# Patient Record
Sex: FEMALE | Race: WHITE | NOT HISPANIC OR LATINO | Employment: FULL TIME | URBAN - METROPOLITAN AREA
[De-identification: names, ages, dates, MRNs, and addresses within clinical notes are randomized per-mention and may not be internally consistent; named-entity substitution may affect disease eponyms.]

---

## 2017-04-21 ENCOUNTER — OFFICE VISIT - RIVER FALLS (OUTPATIENT)
Dept: FAMILY MEDICINE | Facility: CLINIC | Age: 37
End: 2017-04-21

## 2017-10-05 ENCOUNTER — AMBULATORY - RIVER FALLS (OUTPATIENT)
Dept: FAMILY MEDICINE | Facility: CLINIC | Age: 37
End: 2017-10-05

## 2018-04-25 ENCOUNTER — OFFICE VISIT - RIVER FALLS (OUTPATIENT)
Dept: FAMILY MEDICINE | Facility: CLINIC | Age: 38
End: 2018-04-25

## 2018-04-25 ASSESSMENT — MIFFLIN-ST. JEOR: SCORE: 1520.44

## 2018-04-28 ENCOUNTER — TRANSFERRED RECORDS (OUTPATIENT)
Dept: HEALTH INFORMATION MANAGEMENT | Facility: CLINIC | Age: 38
End: 2018-04-28

## 2018-04-28 LAB — HPV ABSTRACT: NORMAL

## 2018-04-29 LAB
CHOLEST SERPL-MCNC: 127 MG/DL
CHOLEST/HDLC SERPL: 2.4 {RATIO}
CREAT SERPL-MCNC: 0.89 MG/DL (ref 0.5–1.1)
GLUCOSE BLD-MCNC: 79 MG/DL (ref 65–99)
HDLC SERPL-MCNC: 54 MG/DL
LDLC SERPL CALC-MCNC: 58 MG/DL
NONHDLC SERPL-MCNC: 73 MG/DL
TRIGL SERPL-MCNC: 74 MG/DL

## 2020-03-04 ENCOUNTER — OFFICE VISIT - RIVER FALLS (OUTPATIENT)
Dept: FAMILY MEDICINE | Facility: CLINIC | Age: 40
End: 2020-03-04

## 2020-03-04 ASSESSMENT — MIFFLIN-ST. JEOR: SCORE: 1516.81

## 2020-03-05 ENCOUNTER — COMMUNICATION - RIVER FALLS (OUTPATIENT)
Dept: FAMILY MEDICINE | Facility: CLINIC | Age: 40
End: 2020-03-05

## 2020-03-05 LAB
BUN SERPL-MCNC: 18 MG/DL (ref 7–25)
BUN/CREAT RATIO - HISTORICAL: NORMAL (ref 6–22)
CALCIUM SERPL-MCNC: 9.6 MG/DL (ref 8.6–10.2)
CHLORIDE BLD-SCNC: 103 MMOL/L (ref 98–110)
CHOLEST SERPL-MCNC: 124 MG/DL
CHOLEST/HDLC SERPL: 2.3 {RATIO}
CO2 SERPL-SCNC: 29 MMOL/L (ref 20–32)
CREAT SERPL-MCNC: 0.9 MG/DL (ref 0.5–1.1)
EGFRCR SERPLBLD CKD-EPI 2021: 80 ML/MIN/1.73M2
GLUCOSE BLD-MCNC: 81 MG/DL (ref 65–99)
HBA1C MFR BLD: 5.4 %
HDLC SERPL-MCNC: 54 MG/DL
LDLC SERPL CALC-MCNC: 56 MG/DL
NONHDLC SERPL-MCNC: 70 MG/DL
POTASSIUM BLD-SCNC: 4 MMOL/L (ref 3.5–5.3)
SODIUM SERPL-SCNC: 140 MMOL/L (ref 135–146)
TRIGL SERPL-MCNC: 62 MG/DL

## 2020-10-16 ENCOUNTER — OFFICE VISIT - RIVER FALLS (OUTPATIENT)
Dept: FAMILY MEDICINE | Facility: CLINIC | Age: 40
End: 2020-10-16

## 2020-10-16 LAB — HCG UR QL: NEGATIVE

## 2020-10-26 LAB — HPV HIGH RISK: NOT DETECTED

## 2020-10-28 ENCOUNTER — COMMUNICATION - RIVER FALLS (OUTPATIENT)
Dept: FAMILY MEDICINE | Facility: CLINIC | Age: 40
End: 2020-10-28

## 2021-11-02 ENCOUNTER — AMBULATORY - RIVER FALLS (OUTPATIENT)
Dept: FAMILY MEDICINE | Facility: CLINIC | Age: 41
End: 2021-11-02

## 2022-02-11 VITALS
DIASTOLIC BLOOD PRESSURE: 72 MMHG | SYSTOLIC BLOOD PRESSURE: 122 MMHG | BODY MASS INDEX: 23.19 KG/M2 | HEART RATE: 66 BPM | TEMPERATURE: 98.8 F | WEIGHT: 168.6 LBS

## 2022-02-11 VITALS
WEIGHT: 175.6 LBS | TEMPERATURE: 98.5 F | DIASTOLIC BLOOD PRESSURE: 70 MMHG | BODY MASS INDEX: 25.2 KG/M2 | OXYGEN SATURATION: 98 % | HEART RATE: 64 BPM | SYSTOLIC BLOOD PRESSURE: 110 MMHG

## 2022-02-11 VITALS
HEART RATE: 68 BPM | SYSTOLIC BLOOD PRESSURE: 108 MMHG | WEIGHT: 172 LBS | BODY MASS INDEX: 24.62 KG/M2 | DIASTOLIC BLOOD PRESSURE: 84 MMHG | TEMPERATURE: 97.4 F | HEIGHT: 70 IN

## 2022-02-11 VITALS
HEART RATE: 73 BPM | WEIGHT: 171.2 LBS | TEMPERATURE: 97.8 F | HEIGHT: 70 IN | SYSTOLIC BLOOD PRESSURE: 120 MMHG | BODY MASS INDEX: 24.51 KG/M2 | DIASTOLIC BLOOD PRESSURE: 80 MMHG | OXYGEN SATURATION: 98 %

## 2022-02-16 NOTE — PROGRESS NOTES
"   Patient:   PRECIOUS DACOSTA            MRN: [id number]            FIN: [id number]               Age:   36 years     Sex:  Female     :  1980   Associated Diagnoses:   Pelvic pain; Vaginal discharge   Author:   Sandhya Black      Visit Information      Date of Service: 2017 03:40 pm  Performing Location: [facility]  Encounter#: [id number]      Chief Complaint   2017 3:44 PM CDT    Pelvic pain. Pain has been going on for the past 18 months but has gotten inreasingly more painful over the past 7 days. Pt describes pain as sharp.      History of Present Illness   Patient is a 36 year old  female who presents for evaluation of left sided pelvic pain x 18 months. Patient had mirena placed in 2015 and patient notes pelvic pain did seem to onset shortly after this but she thought it was just due to her body changing since she was postpartum. Reports pelvic pain seemed to worsen about 1 month ago. Over the past week, pain again acutely worsened complaining she has been experiencing vaginal pressure similar to how she felt when she was 9 months pregnant prompting her to be evaluated. States pain was up to 6/10 a couple days ago and required her to lay down. Current pain is rated at 4/10. She has been taking tylenol and ibuprofen intermittently with some relief.   Has also been experiencing vaginal discharge for ~18 months as well. Describes it as thick white-yellow colored discharge without odor. At times, discharge soaks through her underwear and pants. She wears panty liners almost daily.   Patient has been  to  for 16 years and in monogomous relationship. She does report having questionable \"BV or PID\" years ago which required her  to be treated as well. She denies having positive STD testing in the past. Unsure of abx prescribed at that time.   Denies dyspareunia, vaginal bleeding, fever, chills, nausea, or vomiting. No hx of ovarian cysts, fibroids, " or endometriosis. Family hx signfiicant for maternal grandmother with breast cancer but otherwise denies gynecological cancers.   Employed as a . Nonsmoker. Occasional etoh use.      Review of Systems   Constitutional:  Negative.    Eye:  Negative.    Ear/Nose/Mouth/Throat:  Negative.    Respiratory:  Negative.    Cardiovascular:  Negative.    Gastrointestinal:  Negative.    Genitourinary:  Negative.       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  Resolved on 7/1/2012 at 31 years.   Family History:    Breast cancer  Grandmother (M)     Procedure history:    Mirena IUD insertion on 11/6/2015 at 35 Years.  Comments:  11/6/2015 11:30 AM - René PATRICIO Erin  Removal due 11/6/2020. Lot# [id number] Exp: 04/18   Social History:        Alcohol Assessment            Current                     Comments:                      12/12/2013 - Dona Pepper MD                     About 1-2 drinks up to 4 times a week.      Tobacco Assessment: Denies Tobacco Use            Never      Substance Abuse Assessment: Denies Substance Abuse            Never      Employment and Education Assessment            Employed                     Comments:                      12/12/2013 - Evgeny  Dona QUAN                     Works as a       Home and Environment Assessment            Marital status: .      Exercise and Physical Activity Assessment: Does not exercise            Exercise frequency: Never.        Physical Examination   Vital Signs   4/21/2017 3:44 PM CDT Temperature Tympanic 98.8 DegF    Peripheral Pulse Rate 66 bpm    Pulse Site Radial artery    HR Method Manual    Systolic Blood Pressure 122 mmHg    Diastolic Blood Pressure 72 mmHg    Mean Arterial Pressure 89 mmHg    BP Site Right arm    BP Method Manual      Measurements from flowsheet : Measurements   4/21/2017 3:44 PM CDT    Weight Measured - Standard                168.6 lb     General:  Alert and oriented, No acute distress.    Neck:  Supple.    Respiratory:  Lungs are clear to auscultation, Respirations are non-labored.    Cardiovascular:  Normal rate, Regular rhythm.    Gastrointestinal:  Soft, Non-tender.    Genitourinary:  Normal external genitalia and vaginal introitus. Normal vaginal vault with moderate amount of white discharge. Mirena IUD strings approximately 4cm in length protruding out of cervical os. No cervical motion tenderness, adnexal masses, or right adnexal tenderness. Left adnexal tenderness on bimanual exam. Cultures obtained for wet prep and GC/Chlamydia.    Musculoskeletal:  Normal range of motion, Normal gait.    Integumentary:  Warm, Dry.    Neurologic:  Alert, Oriented.       Review / Management   Results review:  UA, wet prep, GC and chlamydia pending  Pelvic US ordered.       Impression and Plan   Diagnosis     Pelvic pain (AXC73-DG R10.2).     Vaginal discharge (BBM41-RR N89.8).     Presents for evaluation of 18 month history of left sided pelvic pain that acutely worsened over the past week. Also complains of white vaginal discharge. Vitals normal. Exam significant for moderate amount of white discharge and left adnexal tenderness. IUD strings protruding out of cervical os, but did not palpate  any abnormalities with IUD on bimanual exam. UA, wet prep, and gonorrhea & chlamydia pending. Will obtain pelvic US to assess for any structural pathology or abnormalities. Patient declined any analgesics upon discharge. Recommended her to use tylenol or ibuprofen as needed.  Instructed patient to return to clinic if symptoms not improving or go to ED if symtoms worsen. Patient verbalized understanding and agreement with plan. All questions were answered.    will contact her with pelvic US result once available, I suspet she will need to have IUD removed as pain onset correlates with insertion

## 2022-02-16 NOTE — PROCEDURES
Accession Number:       [id number]  CLINICAL INFORMATION::     None given  LMP::     PROPHYLAXIS  PREV. PAP::     2015  PREV. BX::     NONE GIVEN  SOURCE::     Cervix  STATEMENT OF ADEQUACY::     Satisfactory for evaluation. Endocervical/transformation zone component present. Age and/or menstrual status not provided  INTERPRETATION/RESULT::     Negative for intraepithelial lesion or malignancy.  COMMENT::     This Pap test has been evaluated with computer assisted technology.  CYTOTECHNOLOGIST::     AAM, CT(ASCP) CT Screening location: [place] [address]  REVIEW CYTOTECHNOLOGIST::     AMEE AGUILA(ASCP) CT Screening location: [place] [id number]  COMMENT:     See comment       EXPLANATORY NOTE:         The Pap is a screening test for cervical cancer. It is       not a diagnostic test and is subject to false negative       and false positive results. It is most reliable when a       satisfactory sample, regularly obtained, is submitted       with relevant clinical findings and history, and when       the Pap result is evaluated along with historic and       current clinical information.  HPV mRNA E6/E7:     Not Detected       This test was performed using the APTIMA HPV Assay ([employer]       This assay detects E6/E7 viral messenger RNA (mRNA) from 14       high-risk HPV types (16,18,31,33,35,39,45,51,52,56,58,59,66,68).

## 2022-02-16 NOTE — LETTER
(Inserted Image. Unable to display)     April 26, 2019      PRECIOUS DACOSTA  [address] [place], [place] [id number]          Dear PRECIOUS,      Thank you for selecting [facility] (previously [facility]) for your healthcare needs.      Our records indicate you are due for the following services:     Annual Physical and Gynecologic Exam ~ Yearly wellness exams are important for your ongoing health and wellness.  This exam gives you the opportunity to meet with your health care provider to review your health, update immunizations and to recommend preventive screenings that you may be due for.  At your wellness visit, your Healthcare Provider will determine the need for a gynecologic exam and/or Pap smear.      To schedule an appointment or if you have further questions, please contact your primary clinic:   [facility]       [phone]   [facility]       [phone]              [facility]     [phone]      Powered by [employer]    Sincerely,    Healthcare Providers at [facility]

## 2022-02-16 NOTE — PROGRESS NOTES
Patient:   SHAYLA DACOSTA            MRN: [id number]            FIN: [id number]               Age:   37 years     Sex:  Female     :  1980   Associated Diagnoses:   FH: hemochromatosis; FH: kidney disease; Lipid screening; Well woman exam   Author:   Sandhya Black      Visit Information      Date of Service: 2018 11:00 am  Performing Location: [facility]  Encounter#: [id number]      Primary Care Provider (PCP):  [id number] -UNKNOWN,      Chief Complaint   2018 11:01 AM CDT   Annual exam. Pt is fasting.      Well Adult History   PPC for annual exam  mirena IUD since 2015, last pap NILM   ftr, 73 with hemochromatosis, MI this past yr with Stent placement followed by pacemaker and CHF  dtr has hematuria and pediatric nephrologist from [facility] (Dr Wisam Cottrell), wants Shayla to have protein/creatinine ratio  no other health care concerns      Review of Systems   Constitutional:  Negative.    Eye:  Negative.    Ear/Nose/Mouth/Throat:  Negative.    Respiratory:  Negative.    Cardiovascular:  Negative.    Breast:  Negative.    Gastrointestinal:  Negative.    Genitourinary:  Negative except as documented in history of present illness.    Gynecologic:  Negative except as documented in history of present illness.    Hematology/Lymphatics:  Negative except as documented in history of present illness.    Endocrine:  Negative.    Immunologic:  Negative.    Musculoskeletal:  Negative.    Integumentary:  Negative.    Neurologic:  Negative.    Psychiatric:  Negative.       Health Status   Allergies:    Allergic Reactions (Selected)  No known allergies   Medications:  (Selected)   Documented Medications  Documented  Daily Multi: 1 tab(s), po, daily, 0 Refill(s), Type: Maintenance  Mirena 52 mg intrauteral device: 1 EA ( 52 mg ), intrauteral, once, Instructions: Inserted on 11/6/15-removal due  2020 Lot# [id number] Exp: ., 0 Refill(s), Type: Maintenance   Problem list:     All Problems (Selected)  Cystic fibrosis carrier / SNOMED CT [id number] / Confirmed      Histories   Family History:    Mother    History is negative.  Father  Coronary heart disease  Heart attack  Heart Failure  Heart disease  Kidney disease  Grandmother (M)  Breast cancer     Procedure history:    Mirena IUD insertion on 11/6/2015 at 35 Years.  Comments:  11/6/2015 11:30 AM - Lauriecharlse PATRICIO, Erin  Removal due 11/6/2020. Lot# [id number] Exp: 04/18   Social History:        Alcohol Assessment            Current                     Comments:                      12/12/2013 - Dona Pepper MD                     About 1-2 drinks up to 4 times a week.      Tobacco Assessment: Denies Tobacco Use            Never      Substance Abuse Assessment: Denies Substance Abuse            Never      Employment and Education Assessment            Employed                     Comments:                      12/12/2013 - Evgeny QUAN, Dona                     Works as a       Home and Environment Assessment            Marital status: .      Exercise and Physical Activity Assessment: Does not exercise            Exercise frequency: Never.        Physical Examination   Vital Signs   4/25/2018 11:01 AM CDT Temperature Tympanic 97.4 DegF  LOW    Peripheral Pulse Rate 68 bpm    Pulse Site Radial artery    HR Method Manual    Systolic Blood Pressure 108 mmHg    Diastolic Blood Pressure 84 mmHg  HI    Mean Arterial Pressure 92 mmHg    BP Site Right arm    BP Method Manual      Measurements from flowsheet : Measurements   4/25/2018 11:01 AM CDT Height Measured - Standard 70 in    Weight Measured - Standard 172 lb    BSA 1.96 m2    Body Mass Index 24.68 kg/m2      General:  Alert and oriented, No acute distress.    Eye:  Pupils are equal, round and reactive to light, Intact accommodation, Normal conjunctiva, Vision unchanged.         Periorbital area: Within normal limits.    HENT:  Normocephalic, Tympanic  membranes are clear, Normal hearing, Oral mucosa is moist, No pharyngeal erythema, No sinus tenderness.    Neck:  Supple, Non-tender, No lymphadenopathy, No thyromegaly.    Respiratory:  Lungs are clear to auscultation, Respirations are non-labored, No chest wall tenderness.    Cardiovascular:  Normal rate, Regular rhythm, No murmur, No edema.    Breast:  No mass, No tenderness.    Gastrointestinal:  Soft, Non-tender, Non-distended, Normal bowel sounds, No organomegaly.    Genitourinary:  No costovertebral angle tenderness.         Labia: Bilateral, Within normal limits.         Mons pubis: WNL.         Perineum: Within normal limits.         Vulva: Within normal limits.         Urethra: Within normal limits.         Cervix: Within normal limits.         Uterus: Within normal limits.         Adnexa: Bilateral, Within normal limits.    Lymphatics:  No lymphadenopathy neck, axilla, groin.    Musculoskeletal:  Normal range of motion, Normal strength, No swelling.    Integumentary:  Warm, Dry, Pink.    Neurologic:  Alert, Oriented.    Psychiatric:  Cooperative, Appropriate mood & affect.       Impression and Plan   Diagnosis     FH: hemochromatosis (UXU74-WN Z83.49).     FH: kidney disease (ILX12-OU Z84.1).     Lipid screening (RUL98-EM Z13.220).     Well woman exam (FOP55-SK Z01.419).     Patient Instructions:       Counseled: Patient, Regarding diagnosis, Regarding medications, Verbalized understanding.    Summary:  pap obtained, IUD strings visible    no medications to refill    hemochromatosis: panel ordered today, if abnormal discussion will need to occur about hepatitis screening and immunization  will also need to see hematology    request in chart for urine protein/creat ratio to go to Dr Cottrell    exercise 30 minutes at least 3x/week encouraged  discussed healthy nutrition, lean meats and limiting red meats, deep water fish limited to less than 3x/week  discussed relaxation, sleep regimen and relationship  health  f/u in one year  .

## 2022-02-16 NOTE — PROGRESS NOTES
Chief Complaint    IUD removal and insertion.  History of Present Illness      Patient presents to clinic today with desire to have Mirena IUD removed and new one placed.  She would also like her flu shot.      Negative urine pregnancy test is obtained today. Also discussed with patient risks of pain bleeding infection injury to surrounding tissue and need for further treatment possible uterine perforation, spontaneous expulsion and ectopic pregnancy. She would like to proceed. Alternatives include birth control pills, NuvaRing, Ortho Evra, ParaGard, Nexplanon, Depo-Provera. Also discussed natural family planning and barrier methods. She feels that a Mirena is the best choice for her.      Patient was placed into the stirrups, sterile speculum inserted, pap smear specimen obtained,  cervix prepped with Betadine 3,  strings grasped with sterile ring forceps, uterus sounded to 7 cm.       Mirena placed to the same depth.  strings trimmed.       Estimated blood loss is less than 5 mL. Overall the procedure was tolerated well.      Counseled patient to return to clinic in 2 weeks for string recheck.  Counseled that mirena is now approved to use for contraception for 6 years.  Physical Exam   Vitals & Measurements    T: 98.5  F (Tympanic)  HR: 64 (Peripheral)  BP: 110/70  SpO2: 98%     WT: 175.6 lb   Assessment/Plan       Encounter for immunization (Z23)         Ordered:          influenza virus vaccine, inactivated, 0.5 mL, IM, once, (Completed)          45150 imadm prq id subq/im njxs 1 vaccine (Charge), Quantity: 1, Encounter for immunization          09399 iiv4 vacc no prsv 0.5 ml im (Charge), Quantity: 1, Encounter for immunization                Encounter for IUD insertion (Z30.430)         Ordered:          POC HCG, URINE* ([facility]), Specimen Type: Urine, Collection Date: 10/16/20 13:07:00 CDT                Encounter for IUD removal and reinsertion (Z30.433)         Ordered:          91388 unlisted px skin muc  membrane +subq tissue (Charge), Quantity: 1, Encounter for IUD removal and reinsertion                Well woman exam (Z01.419)         Ordered:          HPV DNA, High Risk with Reflex to Genotypes 16,18* ([facility]), Specimen Type: Pap, Collection Date: 10/16/20 14:29:00 CDT          Thinprep Pap* ([facility]), Specimen Type: Pap, Collection Date: 10/16/20 14:29:00 CDT               mirena removed, new one placed, pap pending, needs mirena exchanged/out in 6 years, fl;u shot provided  Patient Information     Name:PRECIOUS DACOSTA      Address:      [address] [place] [id number]     Sex:Female     YOB: 1980     [phone]     Emergency Contact:[facility], CONTACT     MRN:[id number]     FIN:[id number]     Location[facility]     Date of Service:10/16/2020      Primary Care Physician:       Amisha Avila MD, [phone]      Attending Physician:       Amisha Avila MD, [phone]  Problem List/Past Medical History    Ongoing     Cystic fibrosis carrier    Historical     Pregnancy  Procedure/Surgical History     Insertion of IUD (10/16/2020)            Comments: MIRENA      lot [id number]      ex: 11/2022.     Mirena IUD insertion (11/06/2015)            Comments: Removal due 11/6/2020. Lot# [id number] Exp: 04/18.  Medications    Daily Multi, 1 tab(s), Oral, daily    Mirena, Intrauteral, once  Allergies    No Known Medication Allergies  Social History    Smoking Status     Never smoker     Alcohol      Current     Employment/School      Employed     Exercise - Does not exercise      Exercise frequency: Never.     Home/Environment      Marital status: .     Substance Abuse - Denies Substance Abuse      Never     Tobacco - Denies Tobacco Use      Never  Family History    Breast cancer: Grandmother (M).    CAD - Coronary artery disease: Father.    Cardiac pacemaker in situ: Father.    Cardiomyopathy: Father.    Chronic kidney disease stage 3: Father.     Congestive heart failure: Father.    Coronary heart disease: Father.    Eczema: Son.    Essential hypertension: Father.    Gout: Father.    Heart Failure: Father.    Heart attack: Father.    Heart disease: Father.    Hypertension: Daughter.    Kidney disease: Father.    Myocardial infarction: Father.    Paroxysmal atrial fibrillation: Father.    Sleep apnea: Father.    Mother: History is negative  Immunizations      Vaccine Date Status          influenza virus vaccine, inactivated 10/16/2020 Given          hepatitis A adult vaccine 03/04/2020 Given          influenza virus vaccine, inactivated 11/19/2019 Recorded          influenza virus vaccine, inactivated 10/05/2017 Given          influenza virus vaccine, inactivated 11/12/2016 Given          influenza virus vaccine, inactivated 09/16/2015 Given          tetanus/diphth/pertuss (Tdap) adult/adol 07/01/2015 Given          influenza virus vaccine, inactivated 10/03/2013 Recorded          influenza virus vaccine, inactivated 09/11/2012 Recorded          influenza virus vaccine, inactivated 11/04/2011 Recorded          influenza virus vaccine, inactivated 11/04/2010 Recorded          influenza, H1N1, inactivated 01/12/2010 Recorded          influenza virus vaccine, inactivated 09/18/2009 Recorded          Td 08/12/2008 Recorded          tetanus/diphth/pertuss (Tdap) adult/adol 08/12/2008 Recorded          human papillomavirus vaccine 02/21/2008 Recorded          human papillomavirus vaccine 10/29/2007 Recorded          human papillomavirus vaccine 08/23/2007 Recorded          typhoid, inactivated 10/06/2000 Recorded          yellow fever 10/06/2000 Recorded          Hep A, pediatric/adolescent 10/06/2000 Recorded          hepatitis B pediatric vaccine 02/01/1997 Recorded          hepatitis B pediatric vaccine 09/01/1996 Recorded          hepatitis B pediatric vaccine 08/01/1996 Recorded          Td 08/11/1994 Recorded          MMR (measles/mumps/rubella) 06/19/1990  Recorded          OPV 05/03/1984 Recorded          DTaP 05/03/1984 Recorded          OPV 02/17/1981 Recorded          DTaP 02/17/1981 Recorded          MMR (measles/mumps/rubella) 11/18/1980 Recorded          OPV 02/13/1980 Recorded          DTaP 02/13/1980 Recorded          OPV 1980 Recorded          DTaP 1979 Recorded          OPV 1979 Recorded          DTaP 1979 Recorded  Lab Results       Lab Results (Last 4 results within 90 days)        U HCG POC: NEGATIVE [NEGATIVE  - NEGATIVE] (10/16/20 13:45:00)

## 2022-02-16 NOTE — NURSING NOTE
Comprehensive Intake Entered On:  10/16/2020 1:58 PM CDT    Performed On:  10/16/2020 1:56 PM CDT by Shaina Dumont CMA               Summary   Chief Complaint :   IUD removal and insertion.    Menstrual Status :   Prophylaxis   Weight Measured :   175.6 lb(Converted to: 175 lb 10 oz, 79.651 kg)    Systolic Blood Pressure :   110 mmHg   Diastolic Blood Pressure :   70 mmHg   Mean Arterial Pressure :   83 mmHg   Peripheral Pulse Rate :   64 bpm   BP Site :   Right arm   BP Method :   Manual   HR Method :   Electronic   Temperature Tympanic :   98.5 DegF(Converted to: 36.9 DegC)    Oxygen Saturation :   98 %   Shaian Dumont CMA - 10/16/2020 1:56 PM CDT   Health Status   Allergies Verified? :   Yes   Medication History Verified? :   Yes   Pre-Visit Planning Status :   N/A   Tobacco Use? :   Never smoker   Shaina Dumont CMA - 10/16/2020 1:56 PM CDT   Consents   Consent for Immunization Exchange :   Consent Granted   Consent for Immunizations to Providers :   Consent Granted   Shaina Dumont CMA - 10/16/2020 1:56 PM CDT   Meds / Allergies   (As Of: 10/16/2020 1:58:54 PM CDT)   Allergies (Active)   No Known Medication Allergies  Estimated Onset Date:   Unspecified ; Created By:   Shaina Dumont CMA; Reaction Status:   Active ; Category:   Drug ; Substance:   No Known Medication Allergies ; Type:   Allergy ; Updated By:   Shaina Dumont CMA; Reviewed Date:   3/4/2020 9:15 AM CST        Medication List   (As Of: 10/16/2020 1:58:54 PM CDT)   Home Meds    Levonorgestrel  :   Levonorgestrel ; Status:   Documented ; Ordered As Mnemonic:   Mirena 52 mg intrauteral device ; Simple Display Line:   52 mg, 1 EA, intrauteral, once, Inserted on 11/6/15-removal due  11/6/2020 Lot# [id number] Exp: 04/18., 0 Refill(s) ; Catalog Code:   levonorgestrel ; Order Dt/Tm:   11/6/2015 11:27:42 AM CST          multivitamin with minerals  :   multivitamin with minerals ; Status:   Documented ; Ordered As Mnemonic:   Daily Multi ; Simple Display  Line:   1 tab(s), po, daily, 0 Refill(s) ; Catalog Code:   multivitamin with minerals ; Order Dt/Tm:   4/25/2018 11:04:54 AM CDT            ID Risk Screen   Recent Travel History :   No recent travel   Family Member Travel History :   No recent travel   Other Exposure to Infectious Disease :   Unknown   Shaina Dumont CMA - 10/16/2020 1:56 PM CDT

## 2022-02-16 NOTE — LETTER
(Inserted Image. Unable to display)         October 28, 2020        PRECIOUS DACOSTA  [address], [place] [id number]        Dear PRECIOUS,     Thank you for selecting [facility] for your healthcare needs. Below you will find the results of your recent test(s) done at our clinic.      Your pap smear cells look normal and you do not have high risk HPV, so your next pap smear should be in 5 years.        Result Name Current Result Reference Range   HPV High Risk  Not Detected 10/16/2020 NOT DETECTED -    Thinprep Report   10/16/2020      Please contact my practice at [phone] if you have any questions or concerns.     Sincerely,        Amisha Avila MD      What do your labs mean?  Below is a glossary of commonly ordered labs:  LDL   Bad Cholesterol   HDL   Good Cholesterol  AST/ALT   Liver Function   Cr/Creatinine   Kidney Function  Microalbumin   Kidney Function  BUN   Kidney Function  PSA   Prostate    TSH   Thyroid Hormone  HgbA1c   Diabetes Test   Hgb (Hemoglobin)   Red Blood Cells

## 2022-02-16 NOTE — LETTER
(Inserted Image. Unable to display)       March 05, 2020      PRECIOUS DACOSTA  [address], [place] [id number]        Dear PRECIOUS,     Thank you for selecting [facility] for your healthcare needs. Below you will find the results of your recent test(s) done at our clinic.      Your results are normal!  Please come back for a follow up appointment if you are still having symptoms.       Result Name Current Result Previous Result Reference Range   Sodium Level (mmol/L)  140 3/4/2020  141 4/25/2018 135 - 146   Potassium Level (mmol/L)  4.0 3/4/2020  4.3 4/25/2018 3.5 - 5.3   Chloride Level (mmol/L)  103 3/4/2020  105 4/25/2018 98 - 110   CO2 Level (mmol/L)  29 3/4/2020  29 4/25/2018 20 - 32   Glucose Level (mg/dL)  81 3/4/2020  79 4/25/2018 65 - 99   BUN (mg/dL)  18 3/4/2020  17 4/25/2018 7 - 25   Creatinine Level (mg/dL)  0.90 3/4/2020  0.89 4/25/2018 0.50 - 1.10   BUN/Creat Ratio  NOT APPLICABLE 3/4/2020  NOT APPLICABLE 4/25/2018 6 - 22   eGFR (mL/min/1.73m2)  80 3/4/2020  82 4/25/2018 > OR = 60 -    eGFR  (mL/min/1.73m2)  93 3/4/2020  95 4/25/2018 > OR = 60 -    Calcium Level (mg/dL)  9.6 3/4/2020  9.5 4/25/2018 8.6 - 10.2   Hgb A1c  5.4 3/4/2020   - <5.7   Cholesterol (mg/dL)  124 3/4/2020  127 4/25/2018  - <200   Non-HDL Cholesterol  70 3/4/2020  73 4/25/2018  - <130   HDL (mg/dL)  54 3/4/2020  54 4/25/2018 > OR = 50 -    Cholesterol/HDL Ratio  2.3 3/4/2020  2.4 4/25/2018  - <5.0   LDL  56 3/4/2020  58 4/25/2018    Triglyceride (mg/dL)  62 3/4/2020  74 4/25/2018  - <150     Please contact my practice at [phone] if you have any questions or concerns.     Sincerely,        Amisha Avila MD      What do your labs mean?  Below is a glossary of commonly ordered labs:  LDL   Bad Cholesterol   HDL   Good Cholesterol  AST/ALT   Liver Function   Cr/Creatinine   Kidney Function  Microalbumin   Kidney Function  BUN   Kidney Function  PSA   Prostate    TSH   Thyroid  Hormone  HgbA1c   Diabetes Test   Hgb (Hemoglobin)   Red Blood Cells

## 2022-02-16 NOTE — PROGRESS NOTES
Chief Complaint    Physical  History of Present Illness      Pt here today for annual exam      She would lke      History of Present Illness      Pt here today for annual exam      She would lke      Physical      History of Present Illness      Pt here today for annual exam      She would lke      Review of systems is negative except as per HPI including no fevers, chills, sore throat, runny nose, nausea, vomiting, constipation, diarrhea, rash or new skin lesions, chest pain, palpitations, slurred speech, new paresthesia, shortness of breath or wheeze.             Exam:      see vitals listed below      General: alert and oriented ×3 no acute distress.      HEENT: Normocephalic and atraumatic.       Eyes pupils are equal round and reactive to light extraocular motion is intact. normal conjunctiva      Hearing is grossly normal and there is no otorrhea. Tympanic membranes are pearly grey with a normal light reflex.      Nares are patent there is no rhinorrhea.       Mucous membranes are moist and pink.      Chest: has bilateral rise with no increased work of breathing. clear to auscultation without wheezes, rhonchi, or rales.      Cardiovascular: normal perfusion and brisk capillary refill. S1S2 with regular rate and rhythm and no murmurs, gallops or rubs.      Musculoskeletal: no gross focal abnormalities and normal gait.      Neuro: no gross focal abnormalities and memory seems intact.  CN 2-12 are grossly intact.      Psychiatric: speech is clear and coherent and fluent. Patient dressed appropriately for the weather. Mood is appropriate and affect is full.      Abd: no rebound or guarding, normal BS      Skin: no rash or lesion sidentified      Discussed:      using sunscreen, protecting from sunburn,      taking folic acid 400 mcg daily      refer to usdachooseplate.gov, AHA and ADA for diet and exercise recommendations      consume [id number] mg calcium daily      std screening      regular self skin checks       get 150min /week of aerobic exercise      Ways to get/keep healthy:             1.  BMI (Body Mass Index) is a measurement based on your height and weight.  Try to keep your BMI between 18 and 25.  You can do this by eating 6-10 servings of fruits/vegetables daily, eat 3 servings of low fat dairy daily, drink 6-8 glasses of water daily, eat a baked, broiled or grilled fish twice a week, avoid fatty and fried foods, substitute canola or olive oil for butter.  Increase your  insoluble fiber intake to 25 grams daily (you can add Fibersure or Metamucil  Clear & Natural to your diet if needed)  Avoid foods that have  'trans fatty acids' and that have high fructose corn syrup.   Also try and do both some aerobic exercise (that makes you breath a little hard) and weight bearing exercise (like walking and lifting light weights) 30-40 min at least 3 times per week. Some evidence suggests 30 min 5 days per week.  Know your cholesterol numbers and blood sugar numbers (make an appointment for fasting blood work if you need to check your Lipids).             2.  Consider taking a generic multivitamin daily.  Research supports dietary intake of about 1,500 mg /day of calcium.  Dietary intake is preferred over a supplement. Recent studies show an increased risk of cardiovascular events in people who take large doses of calcium from a supplement.  It is probably safe to take a 600mg supplement daily if dietary intake is difficult.  Also consider taking a Vitamin D supplement in the winter months (1,000 international units daily).              3.  Don't smoke!   Call the Tobacco Quit Line number if needed 2-643-QUIT-NOW ([phone]) and/or make an appointment to talk about ways we can help you.             4.  Be Safe: Wear your seat belt! Change batteries in smoke and CO2 detectors, get help if you are not in a safe relationship ([place] [facility] [employer] # [phone])             5.  Keep up with important screening  tests for certain cancers:  Mammograms for breast cancer (start at age 40 years), Pap smears for cervical cancer (as directed by your health care provider), Colonoscopy for colon cancer (start at age 50 years).   To schedule at mammogram at the [facility] Radiology Department you       should call [phone].             6.  Keep up to date with important Immunizations--Tetanus, Influenza, Shingles, Meningitis, Cervical Cancer Prevention (Gardasil), Pneumonia             7.  If you suffer from Anxiety or Depression make an appointment to  talk about with me about treatment options.  If you are in crisis call the Crisis line at [phone] or [phone]              8.  Prevent Skin Cancer:  There has been an 800 percent increase in women age 18-40 with skin cancer over the last 40 years. This supports the fact the tanning beds add to skin cancer.  One out of 35 men and 1 our of 55 women will develop melanoma.  A regular user of tanning beds has a 75% increase in melanoma , if you tan 1x/year you have 20% increase of melanoma and an increase risk of 2% for each session.  To decrease your risk, Do not use tanning beds AND use a sunscreen daily of SPF 30.  Apply it every 2 hours while in the sun and put it on liberally (2ml per cm squared of skin surface area)  Physical Exam   Vitals & Measurements    T: 97.8   F (Tympanic)  HR: 73(Peripheral)  BP: 120/80  SpO2: 98%     HT: 70 in  WT: 171.2 lb  BMI: 24.56   Assessment/Plan       Encounter for immunization (Z23)         Ordered:          hepatitis A adult vaccine, 1 mL, im, once, (Completed)          10443 imadm prq id subq/im njxs 1 vaccine (Charge), Quantity: 1, Encounter for immunization          95943 hepatitis a vaccine adult for intramuscular use (Charge), Quantity: 1, Encounter for immunization                Well adult exam (Z00.00)         Ordered:          Basic Metabolic Panel* ([facility]), Specimen Type: Serum, Collection Date: 03/04/20  9:39:00 CST          Hemoglobin A1c* ([facility]), Specimen Type: Blood, Collection Date: 03/04/20 9:39:00 CST          Lipid panel with reflex to direct ldl* ([facility]), Specimen Type: Serum, Collection Date: 03/04/20 9:39:00 CST           Patient Information     Name:PRECIOUS DACOSTA      Address:      [address] [place] [id number]     Sex:Female     YOB: 1980     [phone]     Emergency Contact:[facility] EMERGENCY, CONTACT     MRN:[id number]     FIN:[id number]     Location:[facility]     Date of Service:03/04/2020      Primary Care Physician:       NONE ,       Attending Physician:       Amisha Avila MD, [phone]  Problem List/Past Medical History    Ongoing     Cystic fibrosis carrier    Historical     Pregnancy  Procedure/Surgical History     Mirena IUD insertion (11/06/2015)      Comments: Removal due 11/6/2020. Lot# [id number] Exp: 04/18.  Medications    Daily Multi, 1 tab(s), Oral, daily    Mirena 52 mg intrauteral device, 52 mg= 1 EA, Intrauteral, once  Allergies    No Known Medication Allergies  Social History    Smoking Status - 03/04/2020     Never smoker     Alcohol      Current, 12/12/2013     Employment/School      Employed, 12/12/2013     Exercise - Does not exercise, 12/12/2013      Exercise frequency: Never., 12/12/2013     Home/Environment      Marital status: ., 12/12/2013     Substance Abuse - Denies Substance Abuse, 12/12/2013      Never, 12/12/2013     Tobacco - Denies Tobacco Use, 12/12/2013      Never, 12/12/2013  Family History    Breast cancer: Grandmother (M).    CAD - Coronary artery disease: Father.    Cardiac pacemaker in situ: Father.    Cardiomyopathy: Father.    Chronic kidney disease stage 3: Father.    Congestive heart failure: Father.    Coronary heart disease: Father.    Eczema: Son.    Essential hypertension: Father.    Gout: Father.    Heart Failure: Father.    Heart attack: Father.    Heart disease: Father.     Hypertension: Daughter.    Kidney disease: Father.    Myocardial infarction: Father.    Paroxysmal atrial fibrillation: Father.    Sleep apnea: Father.    Mother: History is negative  Immunizations      Vaccine Date Status          hepatitis A adult vaccine 03/04/2020 Given          influenza virus vaccine, inactivated 10/05/2017 Given          influenza virus vaccine, inactivated 11/12/2016 Given          influenza virus vaccine, inactivated 09/16/2015 Given          tetanus/diphth/pertuss (Tdap) adult/adol 07/01/2015 Given          influenza virus vaccine, inactivated 10/03/2013 Recorded          influenza virus vaccine, inactivated 09/11/2012 Recorded          influenza virus vaccine, inactivated 11/04/2011 Recorded          influenza virus vaccine, inactivated 11/04/2010 Recorded          influenza, H1N1, inactivated 01/12/2010 Recorded          influenza virus vaccine, inactivated 09/18/2009 Recorded          Td 08/12/2008 Recorded          tetanus/diphth/pertuss (Tdap) adult/adol 08/12/2008 Recorded          human papillomavirus vaccine 02/21/2008 Recorded          human papillomavirus vaccine 10/29/2007 Recorded          human papillomavirus vaccine 08/23/2007 Recorded          typhoid, inactivated 10/06/2000 Recorded          Hep A, pediatric/adolescent 10/06/2000 Recorded          yellow fever 10/06/2000 Recorded          hepatitis B pediatric vaccine 02/01/1997 Recorded          hepatitis B pediatric vaccine 09/01/1996 Recorded          hepatitis B pediatric vaccine 08/01/1996 Recorded          Td 08/11/1994 Recorded          MMR (measles/mumps/rubella) 06/19/1990 Recorded          OPV 05/03/1984 Recorded          DTaP 05/03/1984 Recorded          OPV 02/17/1981 Recorded          DTaP 02/17/1981 Recorded          MMR (measles/mumps/rubella) 11/18/1980 Recorded          OPV 02/13/1980 Recorded          DTaP 02/13/1980 Recorded          OPV 1979 Recorded          DTaP 1979 Recorded          OPV  1979 Recorded          DTaP 1979 Recorded

## 2022-02-16 NOTE — NURSING NOTE
Comprehensive Intake Entered On:  3/4/2020 9:16 AM CST    Performed On:  3/4/2020 9:13 AM CST by Shaina Dumont CMA               Summary   Chief Complaint :   Physical    Menstrual Status :   Prophylaxis   Weight Measured :   171.2 lb(Converted to: 171 lb 3 oz, 77.66 kg)    Height Measured :   70 in(Converted to: 5 ft 10 in, 177.80 cm)    Body Mass Index :   24.56 kg/m2   Body Surface Area :   1.96 m2   Systolic Blood Pressure :   120 mmHg   Diastolic Blood Pressure :   80 mmHg   Mean Arterial Pressure :   93 mmHg   Peripheral Pulse Rate :   73 bpm   BP Site :   Right arm   BP Method :   Manual   HR Method :   Electronic   Temperature Tympanic :   97.8 DegF(Converted to: 36.6 DegC)  (LOW)    Oxygen Saturation :   98 %   Shaina Dumont CMA - 3/4/2020 9:13 AM CST   Health Status   Allergies Verified? :   Yes   Medication History Verified? :   Yes   Pre-Visit Planning Status :   Completed   Tobacco Use? :   Never smoker   Shaina Dumont CMA - 3/4/2020 9:13 AM CST   Consents   Consent for Immunization Exchange :   Consent Granted   Consent for Immunizations to Providers :   Consent Granted   Shaina Dumont CMA - 3/4/2020 9:13 AM CST   Meds / Allergies   (As Of: 3/4/2020 9:16:34 AM CST)   Allergies (Active)   No Known Medication Allergies  Estimated Onset Date:   Unspecified ; Created By:   Shaina Dumont CMA; Reaction Status:   Active ; Category:   Drug ; Substance:   No Known Medication Allergies ; Type:   Allergy ; Updated By:   Shaina Dumont CMA; Reviewed Date:   3/4/2020 9:15 AM CST        Medication List   (As Of: 3/4/2020 9:16:34 AM CST)   Home Meds    Levonorgestrel  :   Levonorgestrel ; Status:   Documented ; Ordered As Mnemonic:   Mirena 52 mg intrauteral device ; Simple Display Line:   52 mg, 1 EA, intrauteral, once, Inserted on 11/6/15-removal due  11/6/2020 Lot# [id number] Exp: 04/18., 0 Refill(s) ; Catalog Code:   levonorgestrel ; Order Dt/Tm:   11/6/2015 11:27:42 AM CST          multivitamin with minerals   :   multivitamin with minerals ; Status:   Documented ; Ordered As Mnemonic:   Daily Multi ; Simple Display Line:   1 tab(s), po, daily, 0 Refill(s) ; Catalog Code:   multivitamin with minerals ; Order Dt/Tm:   4/25/2018 11:04:54 AM CDT

## 2022-02-18 NOTE — PROCEDURES
Accession Number:       [id number]  CLINICAL INFORMATION::     None given  LMP::     NONE GIVEN  PREV. PAP::     NONE GIVEN  PREV. BX::     NONE GIVEN  SOURCE::     None given  STATEMENT OF ADEQUACY::     Satisfactory for evaluation. Endocervical/transformation zone component present. Age and/or menstrual status not provided  INTERPRETATION/RESULT::     Negative for intraepithelial lesion or malignancy.  CYTOTECHNOLOGIST::     JORGE CT(ASCP) CT Screening location: [address] [address]  COMMENT:     See comment       EXPLANATORY NOTE:         The Pap is a screening test for cervical cancer. It is       not a diagnostic test and is subject to false negative       and false positive results. It is most reliable when a       satisfactory sample, regularly obtained, is submitted       with relevant clinical findings and history, and when       the Pap result is evaluated along with historic and       current clinical information.

## 2022-03-25 ENCOUNTER — OFFICE VISIT (OUTPATIENT)
Dept: FAMILY MEDICINE | Facility: CLINIC | Age: 42
End: 2022-03-25
Payer: COMMERCIAL

## 2022-03-25 VITALS
RESPIRATION RATE: 16 BRPM | WEIGHT: 183 LBS | DIASTOLIC BLOOD PRESSURE: 84 MMHG | SYSTOLIC BLOOD PRESSURE: 120 MMHG | OXYGEN SATURATION: 100 % | HEART RATE: 62 BPM | BODY MASS INDEX: 26.26 KG/M2

## 2022-03-25 DIAGNOSIS — Z14.1 CYSTIC FIBROSIS CARRIER: Primary | ICD-10-CM

## 2022-03-25 DIAGNOSIS — R09.A2 GLOBUS PHARYNGEUS: ICD-10-CM

## 2022-03-25 DIAGNOSIS — R13.11 ORAL PHASE DYSPHAGIA: ICD-10-CM

## 2022-03-25 DIAGNOSIS — Z92.0 HISTORY OF USE OF CONTRACEPTIVE INTRAUTERINE DEVICE (IUD): ICD-10-CM

## 2022-03-25 PROCEDURE — 99214 OFFICE O/P EST MOD 30 MIN: CPT | Performed by: FAMILY MEDICINE

## 2022-03-25 RX ORDER — OMEPRAZOLE 40 MG/1
40 CAPSULE, DELAYED RELEASE ORAL DAILY
Qty: 30 CAPSULE | Refills: 0 | Status: SHIPPED | OUTPATIENT
Start: 2022-03-25 | End: 2022-04-14

## 2022-03-25 NOTE — PROGRESS NOTES
Assessment & Plan   Problem List Items Addressed This Visit        Other    Cystic fibrosis carrier - Primary      Other Visit Diagnoses     History of use of contraceptive intrauterine device (IUD)        placed 10/16/2020    Oral phase dysphagia        Relevant Medications    omeprazole (PRILOSEC) 40 MG DR capsule    Other Relevant Orders    Otolaryngology Referral    Globus pharyngeus        Relevant Orders    Otolaryngology Referral      Globus pharyngeus and oral phase dysphagia started about 18 months ago and been progressively worsening.  Will have patient start omeprazole as she has no evidence of postnasal drip on exam and has tried over-the-counter antihistamines and cold medications without relief.  We will also place referral to ear nose and throat.  Cystic fibrosis carrier and history of contraceptive IUD are added today to her problem list upon chart review of her previous medical records.  Would certainly like her to return to clinic if she has any worsening or new problems or as needed for her next annual exam.                 No follow-ups on file.    Amisha Avila MD  [facility]    Margarita Mcguire is a 41 year old who presents for the following health issues     History of Present Illness       Reason for visit:  Feeling of pressure on neck and lump in throat, rough swallowing, some hoarseness  Symptom onset:  More than a month  Symptoms include:  Throat pressure, feeling of lump in throat, rough swallowing, some hoarseness  Symptom intensity:  Mild  Symptom progression:  Worsening  Had these symptoms before:  No  What makes it worse:  Fabric on neck  What makes it better:  No    She eats 2-3 servings of fruits and vegetables daily.She consumes 0 sweetened beverage(s) daily.She exercises with enough effort to increase her heart rate 10 to 19 minutes per day.  She exercises with enough effort to increase her heart rate 3 or less days per week.   She is taking  medications regularly.     Low patient has had the feeling of a lump in her throat for about 18 months now.  Over the course of time it has slowly gotten progressively worse and is now annoying enough that she is seeking treatment.  She has tried different over-the-counter medications she has tried changing her shirts to of the neck in case her shirts were too tight enough and this is helped.  No unexpected weight loss no difficulty swallowing when she starts to swallow but sometimes has difficulty initiating her swallow.        Review of Systems   As per hpi  Objective    /84 (BP Location: Right arm, Patient Position: Sitting)   Pulse 62   Resp 16   Wt 83 kg (183 lb)   SpO2 100%   BMI 26.26 kg/m    Body mass index is 26.26 kg/m .  Physical Exam       Exam:  General: alert and oriented ×3 no acute distress.    HEENT: pupils are equal round and reactive to light extraocular motion is intact. Normocephalic and atraumatic.     Hearing is grossly normal and there is no otorrhea.     Neck supple, no thyroidmegaly    Chest: has bilateral rise with no increased work of breathing.    Cardiovascular: normal perfusion and brisk capillary refill.    Musculoskeletal: no gross focal abnormalities and normal gait.    Neuro: no gross focal abnormalities and memory seems intact.          Discussed with patient to return to clinic if symptoms worsen or do not improve

## 2022-04-01 DIAGNOSIS — R13.11 ORAL PHASE DYSPHAGIA: ICD-10-CM

## 2022-04-01 NOTE — TELEPHONE ENCOUNTER
Received fax from [facility] requesting prescription. Omeprazole was sent to [facility] pharmacy on 3/25/22.

## 2022-04-03 ENCOUNTER — HEALTH MAINTENANCE LETTER (OUTPATIENT)
Age: 42
End: 2022-04-03

## 2022-04-06 RX ORDER — OMEPRAZOLE 40 MG/1
40 CAPSULE, DELAYED RELEASE ORAL DAILY
Qty: 30 CAPSULE | Refills: 0 | Status: CANCELLED | OUTPATIENT
Start: 2022-04-06

## 2022-04-09 DIAGNOSIS — R13.11 ORAL PHASE DYSPHAGIA: ICD-10-CM

## 2022-04-14 RX ORDER — OMEPRAZOLE 40 MG/1
CAPSULE, DELAYED RELEASE ORAL
Qty: 30 CAPSULE | Refills: 0 | Status: SHIPPED | OUTPATIENT
Start: 2022-04-14 | End: 2023-03-24

## 2022-05-21 ENCOUNTER — MYC MEDICAL ADVICE (OUTPATIENT)
Dept: FAMILY MEDICINE | Facility: CLINIC | Age: 42
End: 2022-05-21
Payer: COMMERCIAL

## 2022-10-03 ENCOUNTER — HEALTH MAINTENANCE LETTER (OUTPATIENT)
Age: 42
End: 2022-10-03

## 2023-03-24 DIAGNOSIS — R13.11 ORAL PHASE DYSPHAGIA: ICD-10-CM

## 2023-03-24 RX ORDER — OMEPRAZOLE 40 MG/1
CAPSULE, DELAYED RELEASE ORAL
Qty: 30 CAPSULE | Refills: 0 | Status: SHIPPED | OUTPATIENT
Start: 2023-03-24 | End: 2023-04-20

## 2023-03-24 NOTE — TELEPHONE ENCOUNTER
Prescription approved per [facility] Refill Protocol.  Riri RIVAS RN  [facility]

## 2023-04-13 ASSESSMENT — ENCOUNTER SYMPTOMS
NERVOUS/ANXIOUS: 0
FREQUENCY: 0
PALPITATIONS: 0
DYSURIA: 0
ARTHRALGIAS: 0
FEVER: 0
EYE PAIN: 0
CHILLS: 0
HEMATOCHEZIA: 0
JOINT SWELLING: 0
DIARRHEA: 0
SHORTNESS OF BREATH: 0
CONSTIPATION: 0
COUGH: 0
BREAST MASS: 0
NAUSEA: 0
DIZZINESS: 0
PARESTHESIAS: 0
WEAKNESS: 0
MYALGIAS: 0
HEMATURIA: 0
ABDOMINAL PAIN: 0
HEADACHES: 0
HEARTBURN: 0
SORE THROAT: 0

## 2023-04-20 ENCOUNTER — OFFICE VISIT (OUTPATIENT)
Dept: FAMILY MEDICINE | Facility: CLINIC | Age: 43
End: 2023-04-20
Payer: COMMERCIAL

## 2023-04-20 VITALS
OXYGEN SATURATION: 98 % | RESPIRATION RATE: 16 BRPM | TEMPERATURE: 98 F | BODY MASS INDEX: 25.62 KG/M2 | DIASTOLIC BLOOD PRESSURE: 70 MMHG | WEIGHT: 179 LBS | HEIGHT: 70 IN | HEART RATE: 69 BPM | SYSTOLIC BLOOD PRESSURE: 120 MMHG

## 2023-04-20 DIAGNOSIS — Z00.00 ROUTINE GENERAL MEDICAL EXAMINATION AT A HEALTH CARE FACILITY: Primary | ICD-10-CM

## 2023-04-20 DIAGNOSIS — Z11.59 NEED FOR HEPATITIS C SCREENING TEST: ICD-10-CM

## 2023-04-20 DIAGNOSIS — Z13.220 SCREENING FOR LIPOID DISORDERS: ICD-10-CM

## 2023-04-20 DIAGNOSIS — Z71.89 ACP (ADVANCE CARE PLANNING): ICD-10-CM

## 2023-04-20 DIAGNOSIS — Z13.1 SCREENING FOR DIABETES MELLITUS: ICD-10-CM

## 2023-04-20 DIAGNOSIS — Z11.4 SCREENING FOR HIV (HUMAN IMMUNODEFICIENCY VIRUS): ICD-10-CM

## 2023-04-20 LAB
CHOLEST SERPL-MCNC: 142 MG/DL
HBA1C MFR BLD: 5.3 % (ref 0–5.6)
HDLC SERPL-MCNC: 58 MG/DL
LDLC SERPL CALC-MCNC: 59 MG/DL
NONHDLC SERPL-MCNC: 84 MG/DL
TRIGL SERPL-MCNC: 125 MG/DL

## 2023-04-20 PROCEDURE — 83036 HEMOGLOBIN GLYCOSYLATED A1C: CPT | Performed by: FAMILY MEDICINE

## 2023-04-20 PROCEDURE — 99396 PREV VISIT EST AGE 40-64: CPT | Performed by: FAMILY MEDICINE

## 2023-04-20 PROCEDURE — 36415 COLL VENOUS BLD VENIPUNCTURE: CPT | Performed by: FAMILY MEDICINE

## 2023-04-20 PROCEDURE — 86803 HEPATITIS C AB TEST: CPT | Performed by: FAMILY MEDICINE

## 2023-04-20 PROCEDURE — 87389 HIV-1 AG W/HIV-1&-2 AB AG IA: CPT | Performed by: FAMILY MEDICINE

## 2023-04-20 PROCEDURE — 80061 LIPID PANEL: CPT | Performed by: FAMILY MEDICINE

## 2023-04-20 ASSESSMENT — ENCOUNTER SYMPTOMS
HEADACHES: 0
CHILLS: 0
COUGH: 0
PALPITATIONS: 0
ABDOMINAL PAIN: 0
WEAKNESS: 0
HEMATURIA: 0
FREQUENCY: 0
HEARTBURN: 0
CONSTIPATION: 0
DYSURIA: 0
EYE PAIN: 0
NAUSEA: 0
FEVER: 0
JOINT SWELLING: 0
SORE THROAT: 0
PARESTHESIAS: 0
NERVOUS/ANXIOUS: 0
HEMATOCHEZIA: 0
DIARRHEA: 0
SHORTNESS OF BREATH: 0
BREAST MASS: 0
ARTHRALGIAS: 0
DIZZINESS: 0
MYALGIAS: 0

## 2023-04-20 NOTE — PROGRESS NOTES
SUBJECTIVE:   CC: Shayla is an 42 year old who presents for preventive health visit.        View : No data to display.              Patient has been advised of split billing requirements and indicates understanding: Yes  Healthy Habits:     Getting at least 3 servings of Calcium per day:  Yes    Bi-annual eye exam:  Yes    Dental care twice a year:  Yes    Sleep apnea or symptoms of sleep apnea:  None    Diet:  Regular (no restrictions)    Frequency of exercise:  None    Taking medications regularly:  Yes    Medication side effects:  Not applicable    PHQ-2 Total Score: 0    Additional concerns today:  No              Today's PHQ-2 Score:       4/13/2023    10:21 AM   PHQ-2 ( 1999 [employer])   Q1: Little interest or pleasure in doing things 0   Q2: Feeling down, depressed or hopeless 0   PHQ-2 Score 0   Q1: Little interest or pleasure in doing things Not at all    Not at all   Q2: Feeling down, depressed or hopeless Not at all    Not at all   PHQ-2 Score 0    0       Have you ever done Advance Care Planning? (For example, a Health Directive, POLST, or a discussion with a medical provider or your loved ones about your wishes): No, advance care planning information given to patient to review.  Patient plans to discuss their wishes with loved ones or provider.      Social History     Tobacco Use     Smoking status: Not on file     Smokeless tobacco: Not on file   Vaping Use     Vaping status: Not on file   Substance Use Topics     Alcohol use: Not on file             4/13/2023    10:21 AM   Alcohol Use   Prescreen: >3 drinks/day or >7 drinks/week? No     Reviewed orders with patient.  Reviewed health maintenance and updated orders accordingly - Yes      Breast Cancer Screening:    FHS-7:       4/13/2023    10:26 AM   Breast CA Risk Assessment (FHS-7)   Did any of your first-degree relatives have breast or ovarian cancer? No   Did any of your relatives have bilateral breast cancer? Unknown   Did any man in your family  have breast cancer? No   Did any woman in your family have breast and ovarian cancer? No   Did any woman in your family have breast cancer before age 50 y? No   Do you have 2 or more relatives with breast and/or ovarian cancer? Yes   Do you have 2 or more relatives with breast and/or bowel cancer? Yes   pt declines referral to genetic counseling for now, will let me know if she changes her mind        Pertinent mammograms are reviewed under the imaging tab.    History of abnormal Pap smear: NO - age 30-65 PAP every 5 years with negative HPV co-testing recommended     Reviewed and updated as needed this visit by clinical staff    Allergies  Meds   Med Hx  Surg Hx  Fam Hx          Reviewed and updated as needed this visit by Provider       Med Hx  Surg Hx  Fam Hx         History reviewed. No pertinent past medical history.   Past Surgical History:   Procedure Laterality Date     TOOTH EXTRACTION      wisdom teeth       Review of Systems   Constitutional: Negative for chills and fever.   HENT: Negative for congestion, ear pain, hearing loss and sore throat.    Eyes: Negative for pain and visual disturbance.   Respiratory: Negative for cough and shortness of breath.    Cardiovascular: Negative for chest pain, palpitations and peripheral edema.   Gastrointestinal: Negative for abdominal pain, constipation, diarrhea, heartburn, hematochezia and nausea.   Breasts:  Negative for tenderness, breast mass and discharge.   Genitourinary: Positive for vaginal discharge. Negative for dysuria, frequency, genital sores, hematuria, pelvic pain, urgency and vaginal bleeding.   Musculoskeletal: Negative for arthralgias, joint swelling and myalgias.   Skin: Negative for rash.   Neurological: Negative for dizziness, weakness, headaches and paresthesias.   Psychiatric/Behavioral: Negative for mood changes. The patient is not nervous/anxious.           OBJECTIVE:   /70 (BP Location: Right arm, Patient Position: Sitting)    "Pulse 69   Temp 98  F (36.7  C) (Tympanic)   Resp 16   Ht 1.778 m (5' 10\")   Wt 81.2 kg (179 lb)   SpO2 98%   BMI 25.68 kg/m    Physical Exam    General: alert and oriented ×3 no acute distress.    HEENT: pupils are equal round and reactive to light extraocular motion is intact. Normocephalic and atraumatic.     Hearing is grossly normal and there is no otorrhea.     Chest: has bilateral rise with no increased work of breathing.ctab  Cardiovascular: normal perfusion and brisk capillary refill.s1s2    Musculoskeletal: no gross focal abnormalities and normal gait.    Neuro: no gross focal abnormalities and memory seems intact.    Psychiatric: speech is clear and coherent and fluent. Patient dressed appropriately for the weather. Mood is appropriate and affect is full.        ASSESSMENT/PLAN:       ICD-10-CM    1. Routine general medical examination at a health care facility  Z00.00       2. Screening for HIV (human immunodeficiency virus)  Z11.4 HIV Antigen Antibody Combo     HIV Antigen Antibody Combo      3. Need for hepatitis C screening test  Z11.59 Hepatitis C Screen Reflex to HCV RNA Quant and Genotype     Hepatitis C Screen Reflex to HCV RNA Quant and Genotype      4. ACP (advance care planning)  Z71.89       5. Screening for lipoid disorders  Z13.220 Lipid panel reflex to direct LDL Fasting     Lipid panel reflex to direct LDL Fasting      6. Screening for diabetes mellitus  Z13.1 Hemoglobin A1c     Hemoglobin A1c        NONFASTING labs for screening for diabetes and hyperlipidemia.  Using joint medical decision making we have also ordered her hepatitis C and HIV screening.          COUNSELING:  Reviewed preventive health counseling, as reflected in patient instructions        She has no history on file for tobacco use.          Amisha Avila MD  [facility]  "

## 2023-04-22 LAB
HCV AB SERPL QL IA: NONREACTIVE
HIV 1+2 AB+HIV1 P24 AG SERPL QL IA: NONREACTIVE

## 2024-05-20 ENCOUNTER — OFFICE VISIT (OUTPATIENT)
Dept: FAMILY MEDICINE | Facility: CLINIC | Age: 44
End: 2024-05-20
Payer: COMMERCIAL

## 2024-05-20 VITALS
OXYGEN SATURATION: 99 % | SYSTOLIC BLOOD PRESSURE: 124 MMHG | RESPIRATION RATE: 16 BRPM | DIASTOLIC BLOOD PRESSURE: 74 MMHG | TEMPERATURE: 98.1 F | HEIGHT: 70 IN | BODY MASS INDEX: 25.77 KG/M2 | WEIGHT: 180 LBS | HEART RATE: 62 BPM

## 2024-05-20 DIAGNOSIS — R20.2 RIGHT HAND PARESTHESIA: Primary | ICD-10-CM

## 2024-05-20 LAB — TSH SERPL DL<=0.005 MIU/L-ACNC: 2.7 UIU/ML (ref 0.3–4.2)

## 2024-05-20 PROCEDURE — 84443 ASSAY THYROID STIM HORMONE: CPT | Performed by: FAMILY MEDICINE

## 2024-05-20 PROCEDURE — 86618 LYME DISEASE ANTIBODY: CPT | Performed by: FAMILY MEDICINE

## 2024-05-20 PROCEDURE — G2211 COMPLEX E/M VISIT ADD ON: HCPCS | Performed by: FAMILY MEDICINE

## 2024-05-20 PROCEDURE — 36415 COLL VENOUS BLD VENIPUNCTURE: CPT | Performed by: FAMILY MEDICINE

## 2024-05-20 PROCEDURE — 99213 OFFICE O/P EST LOW 20 MIN: CPT | Performed by: FAMILY MEDICINE

## 2024-05-20 RX ORDER — PREDNISONE 20 MG/1
40 TABLET ORAL DAILY
Qty: 10 TABLET | Refills: 0 | Status: SHIPPED | OUTPATIENT
Start: 2024-05-20 | End: 2024-08-06

## 2024-05-20 NOTE — PROGRESS NOTES
"  Assessment & Plan   Problem List Items Addressed This Visit    None  Visit Diagnoses       Right hand paresthesia    -  Primary    Relevant Medications    predniSONE (DELTASONE) 20 MG tablet    Other Relevant Orders    EMG    TSH with free T4 reflex (Completed)    Lyme Disease Total Abs Bld with Reflex to Confirm CLIA    Orthopedic  Referral           Right hand paresthesia described in the first second third and half of the fourth digit distribution of radial nerve.  Started a couple of weeks ago and has been getting progressively worse.  She feels that it is unlikely   overuse injury.  It is making her feel pretty miserable at this time.  Will get her started on some prednisone, ordered an EMG, will get a Lyme disease and TSH and patient will continue to use her cock-up wrist splint and referral placed to orthopedic hand surgery.  Welcome to return to clinic if symptoms worsen or do not improve.      The longitudinal plan of care for the diagnosis(es)/condition(s) as documented were addressed during this visit. Due to the added complexity in care, I will continue to support Shayla in the subsequent management and with ongoing continuity of care.         BMI  Estimated body mass index is 25.83 kg/m  as calculated from the following:    Height as of this encounter: 1.778 m (5' 10\").    Weight as of this encounter: 81.6 kg (180 lb).             Margarita Mcguire is a 43 year old, presenting for the following health issues:  Musculoskeletal Problem (Pt c/o R hand pain, swelling, no known injury. She has tried rest, ice, wrist brace with no relief. )        5/20/2024    10:41 AM   Additional Questions   Roomed by chuy     History of Present Illness       Reason for visit:  Hand Pain  Symptom onset:  1-2 weeks ago  Symptoms include:  Burning, numbness, weakness, minor swelling in hand.  No known injury. Interferes with daily activities.  Intermittent pain in ball/big toe of foot - related?  Symptom intensity: " " Moderate  Symptom progression:  Worsening  Had these symptoms before:  No  What makes it worse:  Didn't respond to use of wrist brace overnight for >7 nights and ergonomic work changes.  What makes it better:  Pain is dulled with direct icing.    She eats 2-3 servings of fruits and vegetables daily.She consumes 0 sweetened beverage(s) daily.She exercises with enough effort to increase her heart rate 9 or less minutes per day.  She exercises with enough effort to increase her heart rate 3 or less days per week.   She is taking medications regularly.                     Objective    /74 (BP Location: Right arm, Patient Position: Sitting)   Pulse 62   Temp 98.1  F (36.7  C) (Tympanic)   Resp 16   Ht 1.778 m (5' 10\")   Wt 81.6 kg (180 lb)   LMP  (LMP Unknown)   SpO2 99%   BMI 25.83 kg/m    Body mass index is 25.83 kg/m .  Physical Exam               Signed Electronically by: Amisha Avila MD    "

## 2024-05-21 ENCOUNTER — MEDICAL CORRESPONDENCE (OUTPATIENT)
Dept: HEALTH INFORMATION MANAGEMENT | Facility: CLINIC | Age: 44
End: 2024-05-21
Payer: COMMERCIAL

## 2024-05-21 LAB — B BURGDOR IGG+IGM SER QL: 0.08

## 2024-05-22 ENCOUNTER — TRANSFERRED RECORDS (OUTPATIENT)
Dept: HEALTH INFORMATION MANAGEMENT | Facility: CLINIC | Age: 44
End: 2024-05-22

## 2024-05-22 ENCOUNTER — ANCILLARY PROCEDURE (OUTPATIENT)
Dept: GENERAL RADIOLOGY | Facility: CLINIC | Age: 44
End: 2024-05-22
Attending: ORTHOPAEDIC SURGERY
Payer: COMMERCIAL

## 2024-05-22 DIAGNOSIS — G56.00: ICD-10-CM

## 2024-05-29 RX ORDER — GABAPENTIN 300 MG/1
CAPSULE ORAL
Qty: 90 CAPSULE | Refills: 3 | Status: SHIPPED | OUTPATIENT
Start: 2024-05-29 | End: 2024-08-06

## 2024-06-26 ENCOUNTER — TRANSFERRED RECORDS (OUTPATIENT)
Dept: HEALTH INFORMATION MANAGEMENT | Facility: CLINIC | Age: 44
End: 2024-06-26
Payer: COMMERCIAL

## 2024-07-11 ENCOUNTER — TRANSFERRED RECORDS (OUTPATIENT)
Dept: HEALTH INFORMATION MANAGEMENT | Facility: CLINIC | Age: 44
End: 2024-07-11
Payer: COMMERCIAL

## 2024-07-16 ENCOUNTER — ANCILLARY PROCEDURE (OUTPATIENT)
Dept: MAMMOGRAPHY | Facility: HOSPITAL | Age: 44
End: 2024-07-16
Attending: FAMILY MEDICINE
Payer: COMMERCIAL

## 2024-07-16 DIAGNOSIS — Z12.31 VISIT FOR SCREENING MAMMOGRAM: ICD-10-CM

## 2024-07-16 PROCEDURE — 77063 BREAST TOMOSYNTHESIS BI: CPT

## 2024-07-26 ENCOUNTER — TRANSFERRED RECORDS (OUTPATIENT)
Dept: HEALTH INFORMATION MANAGEMENT | Facility: CLINIC | Age: 44
End: 2024-07-26
Payer: COMMERCIAL

## 2024-07-27 ENCOUNTER — HEALTH MAINTENANCE LETTER (OUTPATIENT)
Age: 44
End: 2024-07-27

## 2024-07-31 SDOH — HEALTH STABILITY: PHYSICAL HEALTH: ON AVERAGE, HOW MANY MINUTES DO YOU ENGAGE IN EXERCISE AT THIS LEVEL?: 20 MIN

## 2024-07-31 SDOH — HEALTH STABILITY: PHYSICAL HEALTH: ON AVERAGE, HOW MANY DAYS PER WEEK DO YOU ENGAGE IN MODERATE TO STRENUOUS EXERCISE (LIKE A BRISK WALK)?: 2 DAYS

## 2024-07-31 ASSESSMENT — SOCIAL DETERMINANTS OF HEALTH (SDOH): HOW OFTEN DO YOU GET TOGETHER WITH FRIENDS OR RELATIVES?: TWICE A WEEK

## 2024-08-06 ENCOUNTER — OFFICE VISIT (OUTPATIENT)
Dept: FAMILY MEDICINE | Facility: CLINIC | Age: 44
End: 2024-08-06
Payer: COMMERCIAL

## 2024-08-06 VITALS
WEIGHT: 184.7 LBS | SYSTOLIC BLOOD PRESSURE: 130 MMHG | DIASTOLIC BLOOD PRESSURE: 70 MMHG | TEMPERATURE: 98.4 F | HEART RATE: 58 BPM | HEIGHT: 70 IN | BODY MASS INDEX: 26.44 KG/M2 | OXYGEN SATURATION: 98 % | RESPIRATION RATE: 16 BRPM

## 2024-08-06 DIAGNOSIS — Z00.00 ROUTINE GENERAL MEDICAL EXAMINATION AT A HEALTH CARE FACILITY: Primary | ICD-10-CM

## 2024-08-06 DIAGNOSIS — R20.2 RIGHT HAND PARESTHESIA: ICD-10-CM

## 2024-08-06 DIAGNOSIS — D48.5 NEOPLASM OF UNCERTAIN BEHAVIOR OF SKIN: ICD-10-CM

## 2024-08-06 DIAGNOSIS — Z80.3 FAMILY HISTORY OF MALIGNANT NEOPLASM OF BREAST: ICD-10-CM

## 2024-08-06 PROCEDURE — 99396 PREV VISIT EST AGE 40-64: CPT | Performed by: FAMILY MEDICINE

## 2024-08-06 PROCEDURE — 99213 OFFICE O/P EST LOW 20 MIN: CPT | Mod: 25 | Performed by: FAMILY MEDICINE

## 2024-08-06 RX ORDER — GABAPENTIN 300 MG/1
CAPSULE ORAL
Qty: 90 CAPSULE | Refills: 3 | Status: SHIPPED | OUTPATIENT
Start: 2024-08-06 | End: 2024-09-03

## 2024-08-06 NOTE — PATIENT INSTRUCTIONS
Patient Education   Preventive Care Advice   This is general advice given by our system to help you stay healthy. However, your care team may have specific advice just for you. Please talk to your care team about your preventive care needs.  Nutrition  Eat 5 or more servings of fruits and vegetables each day.  Try wheat bread, brown rice and whole grain pasta (instead of white bread, rice, and pasta).  Get enough calcium and vitamin D. Check the label on foods and aim for 100% of the RDA (recommended daily allowance).  Lifestyle  Exercise at least 150 minutes each week  (30 minutes a day, 5 days a week).  Do muscle strengthening activities 2 days a week. These help control your weight and prevent disease.  No smoking.  Wear sunscreen to prevent skin cancer.  Have a dental exam and cleaning every 6 months.  Yearly exams  See your health care team every year to talk about:  Any changes in your health.  Any medicines your care team has prescribed.  Preventive care, family planning, and ways to prevent chronic diseases.  Shots (vaccines)   HPV shots (up to age 26), if you've never had them before.  Hepatitis B shots (up to age 59), if you've never had them before.  COVID-19 shot: Get this shot when it's due.  Flu shot: Get a flu shot every year.  Tetanus shot: Get a tetanus shot every 10 years.  Pneumococcal, hepatitis A, and RSV shots: Ask your care team if you need these based on your risk.  Shingles shot (for age 50 and up)  General health tests  Diabetes screening:  Starting at age 35, Get screened for diabetes at least every 3 years.  If you are younger than age 35, ask your care team if you should be screened for diabetes.  Cholesterol test: At age 39, start having a cholesterol test every 5 years, or more often if advised.  Bone density scan (DEXA): At age 50, ask your care team if you should have this scan for osteoporosis (brittle bones).  Hepatitis C: Get tested at least once in your life.  STIs (sexually  transmitted infections)  Before age 24: Ask your care team if you should be screened for STIs.  After age 24: Get screened for STIs if you're at risk. You are at risk for STIs (including HIV) if:  You are sexually active with more than one person.  You don't use condoms every time.  You or a partner was diagnosed with a sexually transmitted infection.  If you are at risk for HIV, ask about PrEP medicine to prevent HIV.  Get tested for HIV at least once in your life, whether you are at risk for HIV or not.  Cancer screening tests  Cervical cancer screening: If you have a cervix, begin getting regular cervical cancer screening tests starting at age 21.  Breast cancer scan (mammogram): If you've ever had breasts, begin having regular mammograms starting at age 40. This is a scan to check for breast cancer.  Colon cancer screening: It is important to start screening for colon cancer at age 45.  Have a colonoscopy test every 10 years (or more often if you're at risk) Or, ask your provider about stool tests like a FIT test every year or Cologuard test every 3 years.  To learn more about your testing options, visit:   .  For help making a decision, visit:   https://bit.ly/tt64833.  Prostate cancer screening test: If you have a prostate, ask your care team if a prostate cancer screening test (PSA) at age 55 is right for you.  Lung cancer screening: If you are a current or former smoker ages 50 to 80, ask your care team if ongoing lung cancer screenings are right for you.  For informational purposes only. Not to replace the advice of your health care provider. Copyright   2023 [employer]. All rights reserved. Clinically reviewed by the [facility] Transitions Program. My[employer] [id number] - REV 01/24.

## 2024-08-06 NOTE — PROGRESS NOTES
"Preventive Care Visit  [facility]  Amisha Avila MD, Family Medicine  Aug 6, 2024      Assessment & Plan   Problem List Items Addressed This Visit       Family history of malignant neoplasm of breast     Referred to genetic counseling August 6, 2024.  Her maternal grandmother, and 2 maternal aunts have all had breast cancer.           Relevant Orders    Adult Oncology/Hematology  Referral     Other Visit Diagnoses       Routine general medical examination at a health care facility    -  Primary    Right hand paresthesia        Relevant Medications    gabapentin (NEURONTIN) 300 MG capsule    Neoplasm of uncertain behavior of skin            Family history of breast cancer in her maternal grandmother and 2 maternal aunts.  Referral placed to genetic counseling after discussion with patient    Right hand paresthesias are improving now that she is 1 month postop from surgery with carpal tunnel release.  However they have not resolved and she continues to need her gabapentin.  This is renewed today.  Counseled patient that as her symptoms improve she can slowly taper down off of the gabapentin.      Left upper extremity brown skin lesion, see images below, but patient return to clinic for removal.  Did discuss having the excised lesion versus referral to Derm versus referral to associate provider for exam with dermatoscope.  Patient would prefer to proceed with biopsy.               BMI  Estimated body mass index is 26.5 kg/m  as calculated from the following:    Height as of this encounter: 1.778 m (5' 10\").    Weight as of this encounter: 83.8 kg (184 lb 11.2 oz).   Weight management plan: Discussed healthy diet and exercise guidelines    Counseling  Appropriate preventive services were addressed with this patient via screening, questionnaire, or discussion as appropriate for fall prevention, nutrition, physical activity, Tobacco-use cessation, weight loss and cognition.  " Checklist reviewing preventive services available has been given to the patient.  Reviewed patient's diet, addressing concerns and/or questions.   She is at risk for lack of exercise and has been provided with information to increase physical activity for the benefit of her well-being.           Margarita Mcguire is a 43 year old, presenting for the following:  Physical        8/6/2024     3:27 PM   Additional Questions   [facility]  Patient does not have a Health Care Directive or Living Will: Discussed advance care planning with patient; information given to patient to review.    HPI  BROWN SPOT THAT GETS SCALY ON LEFT ARM, asnd annual exam            7/31/2024   General Health   How would you rate your overall physical health? Good   Feel stress (tense, anxious, or unable to sleep) To some extent      (!) STRESS CONCERN      7/31/2024   Nutrition   Three or more servings of calcium each day? Yes   Diet: Regular (no restrictions)   How many servings of fruit and vegetables per day? (!) 2-3   How many sweetened beverages each day? 0-1            7/31/2024   Exercise   Days per week of moderate/strenous exercise 2 days   Average minutes spent exercising at this level 20 min      (!) EXERCISE CONCERN      7/31/2024   Social Factors   Frequency of gathering with friends or relatives Twice a week   Worry food won't last until get money to buy more No   Food not last or not have enough money for food? No   Do you have housing? (Housing is defined as stable permanent housing and does not include staying ouside in a car, in a tent, in an abandoned building, in an overnight shelter, or couch-surfing.) No   Are you worried about losing your housing? No   Lack of transportation? No   Unable to get utilities (heat,electricity)? No   Want help with housing or utility concern? No      (!) HOUSING CONCERN PRESENT      7/31/2024   Dental   Dentist two times every year? Yes            7/31/2024    TB Screening   Were you born outside of the [place]? No              Today's PHQ-2 Score:       5/19/2024    10:13 PM   PHQ-2 ( 1999 Pfizer)   Q1: Little interest or pleasure in doing things 0   Q2: Feeling down, depressed or hopeless 1   PHQ-2 Score 1   Q1: Little interest or pleasure in doing things Not at all   Q2: Feeling down, depressed or hopeless Several days   PHQ-2 Score 1         7/31/2024   Substance Use   Alcohol more than 3/day or more than 7/wk No   Do you use any other substances recreationally? No        Social History     Tobacco Use    Smoking status: Never     Passive exposure: Never    Smokeless tobacco: Never   Vaping Use    Vaping status: Never Used   Substance Use Topics    Alcohol use: Yes    Drug use: Never           7/16/2024   LAST FHS-7 RESULTS   1st degree relative breast or ovarian cancer No   Any relative bilateral breast cancer No   Any male have breast cancer No   Any ONE woman have BOTH breast AND ovarian cancer No   Any woman with breast cancer before 50yrs No   2 or more relatives with breast AND/OR ovarian cancer Yes   2 or more relatives with breast AND/OR bowel cancer Yes                   7/31/2024   STI Screening   New sexual partner(s) since last STI/HIV test? No        History of abnormal Pap smear: No - age 30- 64 PAP with HPV every 5 years recommended       ASCVD Risk   The 10-year ASCVD risk score (Beto MORE, et al., 2019) is: 0.4%    Values used to calculate the score:      Age: 43 years      Sex: Female      Is Non- : No      Diabetic: No      Tobacco smoker: No      Systolic Blood Pressure: 130 mmHg      Is BP treated: No      HDL Cholesterol: 58 mg/dL      Total Cholesterol: 142 mg/dL        7/31/2024   Contraception/Family Planning   Questions about contraception or family planning No           Reviewed and updated as needed this visit by Provider                             Objective    Exam  /70 (BP Location: Right arm, Patient  "Position: Sitting)   Pulse 58   Temp 98.4  F (36.9  C) (Tympanic)   Resp 16   Ht 1.778 m (5' 10\")   Wt 83.8 kg (184 lb 11.2 oz)   LMP  (LMP Unknown)   SpO2 98%   BMI 26.50 kg/m     Estimated body mass index is 26.5 kg/m  as calculated from the following:    Height as of this encounter: 1.778 m (5' 10\").    Weight as of this encounter: 83.8 kg (184 lb 11.2 oz).    Physical Exam          General: alert and oriented ×3 no acute distress.    HEENT: pupils are equal round and reactive to light extraocular motion is intact. Normocephalic and atraumatic.     Hearing is grossly normal and there is no otorrhea.     Chest: has bilateral rise with no increased work of breathing.  ctab    Cardiovascular: normal perfusion and brisk capillary refill. s1s2    Musculoskeletal: no gross focal abnormalities and normal gait.    Neuro: no gross focal abnormalities and memory seems intact.    Psychiatric: speech is clear and coherent and fluent. Patient dressed appropriately for the weather. Mood is appropriate and affect is full.              Signed Electronically by: Amisha Avila MD    "

## 2024-08-07 ENCOUNTER — PATIENT OUTREACH (OUTPATIENT)
Dept: ONCOLOGY | Facility: CLINIC | Age: 44
End: 2024-08-07
Payer: COMMERCIAL

## 2024-08-07 NOTE — ASSESSMENT & PLAN NOTE
Referred to genetic counseling August 6, 2024.  Her maternal grandmother, and 2 maternal aunts have all had breast cancer.

## 2024-08-07 NOTE — PROGRESS NOTES
Writer received Cancer Risk Management Program referral, referred for:    Family history of malignant neoplasm of breast.    Referred By    Provider Department Location Phone   Amisha Avila MD [facility] [phone]        Reviewed for appropriate plan, and sent to New Patient Scheduling for completion.

## 2024-08-28 ENCOUNTER — TRANSFERRED RECORDS (OUTPATIENT)
Dept: HEALTH INFORMATION MANAGEMENT | Facility: CLINIC | Age: 44
End: 2024-08-28
Payer: COMMERCIAL

## 2024-09-03 ENCOUNTER — OFFICE VISIT (OUTPATIENT)
Dept: FAMILY MEDICINE | Facility: CLINIC | Age: 44
End: 2024-09-03
Attending: FAMILY MEDICINE
Payer: COMMERCIAL

## 2024-09-03 VITALS
HEART RATE: 66 BPM | WEIGHT: 184 LBS | OXYGEN SATURATION: 98 % | DIASTOLIC BLOOD PRESSURE: 64 MMHG | SYSTOLIC BLOOD PRESSURE: 134 MMHG | BODY MASS INDEX: 26.34 KG/M2 | RESPIRATION RATE: 16 BRPM | HEIGHT: 70 IN | TEMPERATURE: 98 F

## 2024-09-03 DIAGNOSIS — D48.5 NEOPLASM OF UNCERTAIN BEHAVIOR OF SKIN: Primary | ICD-10-CM

## 2024-09-03 PROCEDURE — 88305 TISSUE EXAM BY PATHOLOGIST: CPT | Performed by: PATHOLOGY

## 2024-09-03 PROCEDURE — 88342 IMHCHEM/IMCYTCHM 1ST ANTB: CPT | Performed by: PATHOLOGY

## 2024-09-03 PROCEDURE — 11401 EXC TR-EXT B9+MARG 0.6-1 CM: CPT | Performed by: FAMILY MEDICINE

## 2024-09-03 NOTE — PROGRESS NOTES
Assessment & Plan   Problem List Items Addressed This Visit    None  Visit Diagnoses       Neoplasm of uncertain behavior of skin    -  Primary    Relevant Orders    Surgical Pathology Exam                Procedure excision of lesion  Discussed with patient that the lesion appears abnormal.  We could excise the lesion which would allow up to send it to pathology to determine if the lesion was abnormal or cancerous.  Risks include pain, bleeding, infection, injury to surrounding tissue, and need for further treatment.  Alternatives include referral to another provider or specialist or to monitor.  Patient decided to proceed with excision of lesion today.     Location confirmed with patient,     SIZE: 6 mm     Prep: Betadine      anesthesia local,  provided with 1 ml 1% lidocaine with epinephrine in a 1:1 solution with bupivicaine 0.5% plain after area prepped with alcohol      pt then prepped  with betadine and draped in usual sterile fashion,6mm round blade was used to excise the lesion, this was grasped with pickups then excised with scissors, specimen sent to pathology.      2  simple interrupted sutures of  4-0 ethilon used to close the incision           dressing placed, post procedure instructions provided. rtc 1 week for suture removal        EBL:<3ml  Tolerated procedure well.                Discussed with patient to return to clinic if symptoms worsen or do not improve             Subjective   Shayla is a 44 year old, presenting for the following health issues:  Biopsy (Pt here for skin bx.)        9/3/2024     3:22 PM   Additional Questions   Roomed by Marti     History of Present Illness       Reason for visit:  In office removal of skin legion on left arm as recommended during annual visit in August 2024.   She is taking medications regularly.                     Objective    /64 (BP Location: Right arm, Patient Position: Sitting)   Pulse 66   Temp 98  F (36.7  C) (Tympanic)   Resp 16   Ht  "1.778 m (5' 10\")   Wt 83.5 kg (184 lb)   LMP  (LMP Unknown)   SpO2 98%   BMI 26.40 kg/m    Body mass index is 26.4 kg/m .  Physical Exam               Signed Electronically by: Amisha Avila MD    "

## 2024-09-06 LAB
PATH REPORT.COMMENTS IMP SPEC: NORMAL
PATH REPORT.COMMENTS IMP SPEC: NORMAL
PATH REPORT.FINAL DX SPEC: NORMAL
PATH REPORT.GROSS SPEC: NORMAL
PATH REPORT.MICROSCOPIC SPEC OTHER STN: NORMAL
PATH REPORT.MICROSCOPIC SPEC OTHER STN: NORMAL
PATH REPORT.RELEVANT HX SPEC: NORMAL
PHOTO IMAGE: NORMAL

## 2024-09-11 ENCOUNTER — OFFICE VISIT (OUTPATIENT)
Dept: FAMILY MEDICINE | Facility: CLINIC | Age: 44
End: 2024-09-11
Payer: COMMERCIAL

## 2024-09-11 VITALS
SYSTOLIC BLOOD PRESSURE: 104 MMHG | OXYGEN SATURATION: 99 % | BODY MASS INDEX: 26.34 KG/M2 | WEIGHT: 184 LBS | TEMPERATURE: 98.1 F | DIASTOLIC BLOOD PRESSURE: 74 MMHG | RESPIRATION RATE: 16 BRPM | HEART RATE: 62 BPM | HEIGHT: 70 IN

## 2024-09-11 DIAGNOSIS — Z48.02 VISIT FOR SUTURE REMOVAL: Primary | ICD-10-CM

## 2024-09-11 PROCEDURE — 99024 POSTOP FOLLOW-UP VISIT: CPT | Performed by: FAMILY MEDICINE

## 2024-09-11 NOTE — PROGRESS NOTES
"  Assessment & Plan   Problem List Items Addressed This Visit    None  Visit Diagnoses       Visit for suture removal    -  Primary           2 simple interrupted sutures from patient's left forearm removed.  Incision is healing nicely there is no evidence of infection.  Would like patient to return to clinic in 2 to 4 weeks after the lesion has had a chance to completely heal so that we can freeze the area as it ended up being an actinic keratosis             Margarita Mcguire is a 44 year old, presenting for the following health issues:  Suture Removal        9/11/2024     3:36 PM   Additional Questions   Roomed by Dot                        Objective    /74 (BP Location: Right arm, Patient Position: Sitting)   Pulse 62   Temp 98.1  F (36.7  C) (Tympanic)   Resp 16   Ht 1.778 m (5' 10\")   Wt 83.5 kg (184 lb)   LMP  (LMP Unknown)   SpO2 99%   BMI 26.40 kg/m    Body mass index is 26.4 kg/m .  Physical Exam               Signed Electronically by: Amisha Avila MD    Answers submitted by the patient for this visit:  General Questionnaire (Submitted on 8/29/2024)  Chief Complaint: Chronic problems general questions HPI Form  What is the reason for your visit today? : In office removal of skin legion on left arm as recommended during annual visit in August 2024.  How many days per week do you miss taking your medication?: 0    "

## 2025-05-14 ENCOUNTER — OFFICE VISIT (OUTPATIENT)
Dept: FAMILY MEDICINE | Facility: CLINIC | Age: 45
End: 2025-05-14
Payer: COMMERCIAL

## 2025-05-14 VITALS
TEMPERATURE: 99.2 F | SYSTOLIC BLOOD PRESSURE: 120 MMHG | WEIGHT: 177.7 LBS | DIASTOLIC BLOOD PRESSURE: 84 MMHG | RESPIRATION RATE: 16 BRPM | OXYGEN SATURATION: 93 % | HEART RATE: 82 BPM | HEIGHT: 70 IN | BODY MASS INDEX: 25.44 KG/M2

## 2025-05-14 DIAGNOSIS — Z12.11 SCREEN FOR COLON CANCER: Primary | ICD-10-CM

## 2025-05-14 DIAGNOSIS — R30.0 DYSURIA: ICD-10-CM

## 2025-05-14 DIAGNOSIS — N30.01 ACUTE CYSTITIS WITH HEMATURIA: ICD-10-CM

## 2025-05-14 DIAGNOSIS — Z12.4 CERVICAL CANCER SCREENING: ICD-10-CM

## 2025-05-14 DIAGNOSIS — R35.0 FREQUENT URINATION: ICD-10-CM

## 2025-05-14 LAB
ALBUMIN UR-MCNC: >=300 MG/DL
APPEARANCE UR: ABNORMAL
BACTERIA #/AREA URNS HPF: ABNORMAL /HPF
BILIRUB UR QL STRIP: NEGATIVE
COLOR UR AUTO: YELLOW
GLUCOSE UR STRIP-MCNC: NEGATIVE MG/DL
HGB UR QL STRIP: ABNORMAL
KETONES UR STRIP-MCNC: ABNORMAL MG/DL
LEUKOCYTE ESTERASE UR QL STRIP: ABNORMAL
NITRATE UR QL: NEGATIVE
PH UR STRIP: 8.5 [PH] (ref 5–7)
RBC #/AREA URNS AUTO: ABNORMAL /HPF
SP GR UR STRIP: 1.02 (ref 1–1.03)
SQUAMOUS #/AREA URNS AUTO: ABNORMAL /LPF
UROBILINOGEN UR STRIP-ACNC: 0.2 E.U./DL
WBC #/AREA URNS AUTO: ABNORMAL /HPF
WBC CLUMPS #/AREA URNS HPF: PRESENT /HPF

## 2025-05-14 PROCEDURE — 99213 OFFICE O/P EST LOW 20 MIN: CPT | Performed by: FAMILY MEDICINE

## 2025-05-14 PROCEDURE — G2211 COMPLEX E/M VISIT ADD ON: HCPCS | Performed by: FAMILY MEDICINE

## 2025-05-14 PROCEDURE — 81001 URINALYSIS AUTO W/SCOPE: CPT | Performed by: FAMILY MEDICINE

## 2025-05-14 PROCEDURE — 87086 URINE CULTURE/COLONY COUNT: CPT | Performed by: FAMILY MEDICINE

## 2025-05-14 PROCEDURE — 87088 URINE BACTERIA CULTURE: CPT | Performed by: FAMILY MEDICINE

## 2025-05-14 RX ORDER — SULFAMETHOXAZOLE AND TRIMETHOPRIM 800; 160 MG/1; MG/1
1 TABLET ORAL 2 TIMES DAILY
Qty: 14 TABLET | Refills: 0 | Status: SHIPPED | OUTPATIENT
Start: 2025-05-14

## 2025-05-14 NOTE — PROGRESS NOTES
Answers submitted by the patient for this visit:  General Questionnaire (Submitted on 5/13/2025)  Chief Complaint: Chronic problems general questions HPI Form  How many days per week do you miss taking your medication?: 0  General Concern (Submitted on 5/13/2025)  Chief Complaint: Chronic problems general questions HPI Form  What is the reason for your visit today?: UTI/Kidney infection symptoms  When did your symptoms begin?: 1-3 days ago  What are your symptoms?: Uncomfortable urination, fever, right side back pain  How would you describe these symptoms?: Moderate  Are your symptoms:: Worsening  Have you had these symptoms before?: Yes  Have you tried or received treatment for these symptoms before?: Yes  Did that treatment work? : Yes  Please describe the treatment you had:: Antibiotics  Is there anything that makes you feel worse?: No  Is there anything that makes you feel better?: No  Questionnaire about: Chronic problems general questions HPI Form (Submitted on 5/13/2025)  Chief Complaint: Chronic problems general questions HPI Form    Assessment & Plan   Problem List Items Addressed This Visit    None  Visit Diagnoses         Screen for colon cancer    -  Primary    Relevant Orders    Colonoscopy Screening  Referral      Cervical cancer screening          Dysuria        Relevant Orders    UA Macroscopic with reflex to Microscopic and Culture - Lab Collect (Completed)    UA Microscopic with Reflex to Culture (Completed)    Urine Culture      Frequent urination        Relevant Orders    UA Macroscopic with reflex to Microscopic and Culture - Lab Collect (Completed)    UA Microscopic with Reflex to Culture (Completed)    Urine Culture      Acute cystitis with hematuria        Relevant Medications    sulfamethoxazole-trimethoprim (BACTRIM DS) 800-160 MG tablet        Assessment & Plan  Screen for colon cancer  - Due for colon cancer screening. Family history of colon resection and polyps.  - Recommend  "colonoscopy. Discussed options including FIT kit and Cologuard. Patient consented to colonoscopy. Referral to [facility] for procedure.  - Risks and side effects: Discussed potential insurance coverage issues if FIT kit or Cologuard results are abnormal, leading to diagnostic colonoscopy.    Cervical cancer screening  - Due for Pap smear.  - Deferred Pap smear due to current bladder infection. Patient preferred to postpone.    Dysuria  - Symptoms consistent with bladder infection.  - Prescribed antibiotics. Patient declined Pyridium. Advised to notify if symptoms worsen or do not improve by the next day.  - Risks and side effects: Pyridium can cause orange discoloration of urine and affect contact lenses.    Frequent urination  - Associated with acute cystitis.  - Included in treatment plan for acute cystitis with antibiotics.    Acute cystitis with hematuria  - Confirmed bladder infection with hematuria. Urine analysis shows moderate bacteria and white blood cell clumps.  - Initiated antibiotic treatment. Advised to monitor symptoms and report if not improving by the next day.     Consent was obtained from the patient to use an AI documentation tool in the creation of  this note.  Voice recognition software was also used.  There may be associated transcribing errors.     The longitudinal plan of care for the diagnosis(es)/condition(s) as documented were addressed during this visit. Due to the added complexity in care, I will continue to support Shayla in the subsequent management and with ongoing continuity of care.           BMI  Estimated body mass index is 25.5 kg/m  as calculated from the following:    Height as of this encounter: 1.778 m (5' 10\").    Weight as of this encounter: 80.6 kg (177 lb 11.2 oz).   Weight management plan: SKINNY Mcguire is a 44 year old, presenting for the following health issues:  UTI (Pt c/o frequent urination, pressure in pelvis, R sided back pain x 3 days. She is " "concerned about a poss UTI. Yesterday she notes she had a fever 100.8 and today it is 99.2)        5/14/2025     8:49 AM   Additional Questions   Roomed by Marti ERICKSON    History of Present Illness       Reason for visit:  UTI/Kidney infection symptoms  Symptom onset:  1-3 days ago  Symptoms include:  Uncomfortable urination, fever, right side back pain  Symptom intensity:  Moderate  Symptom progression:  Worsening  Had these symptoms before:  Yes  Has tried/received treatment for these symptoms:  Yes  Previous treatment was successful:  Yes  Prior treatment description:  Antibiotics  What makes it worse:  No  What makes it better:  No   She is taking medications regularly.   Shayla Madrigal, 44 years old, female    - Discomfort and pain during urination, started Monday  - Pain in the backside  - Cloudy urine with trace ketones, protein, blood cells, moderate bacteria, white blood cell clumps, and moderate squamous epithelial cells  - Slightly elevated urine pH  - Normal temperature at 99.2 F, slightly elevated for her  - Due for Pap smear and colon cancer screening                  Objective    /84 (BP Location: Right arm, Patient Position: Sitting)   Pulse 82   Temp 99.2  F (37.3  C) (Tympanic)   Resp 16   Ht 1.778 m (5' 10\")   Wt 80.6 kg (177 lb 11.2 oz)   LMP  (LMP Unknown)   SpO2 93%   BMI 25.50 kg/m    Body mass index is 25.5 kg/m .  Physical Exam         Exam:  General: alert and oriented ×3 no acute distress.    HEENT: pupils are equal round and reactive to light extraocular motion is intact. Normocephalic and atraumatic.     Hearing is grossly normal and there is no otorrhea.     Chest: has bilateral rise with no increased work of breathing.    Cardiovascular: normal perfusion and brisk capillary refill.    Musculoskeletal: no gross focal abnormalities and normal gait.    Neuro: no gross focal abnormalities and memory seems intact.    Psychiatric: speech is clear and coherent and fluent. " Patient dressed appropriately for the weather. Mood is appropriate and affect is full.        Discussed with patient to return to clinic if symptoms worsen or do not improve            Signed Electronically by: Amisha Avila MD

## 2025-05-15 ENCOUNTER — RESULTS FOLLOW-UP (OUTPATIENT)
Dept: FAMILY MEDICINE | Facility: CLINIC | Age: 45
End: 2025-05-15

## 2025-05-15 LAB — BACTERIA UR CULT: ABNORMAL

## 2025-06-26 ENCOUNTER — OFFICE VISIT (OUTPATIENT)
Dept: FAMILY MEDICINE | Facility: CLINIC | Age: 45
End: 2025-06-26
Payer: COMMERCIAL

## 2025-06-26 ENCOUNTER — E-VISIT (OUTPATIENT)
Dept: URGENT CARE | Facility: CLINIC | Age: 45
End: 2025-06-26
Payer: COMMERCIAL

## 2025-06-26 ENCOUNTER — RESULTS FOLLOW-UP (OUTPATIENT)
Dept: FAMILY MEDICINE | Facility: CLINIC | Age: 45
End: 2025-06-26

## 2025-06-26 VITALS
TEMPERATURE: 98 F | DIASTOLIC BLOOD PRESSURE: 70 MMHG | SYSTOLIC BLOOD PRESSURE: 110 MMHG | OXYGEN SATURATION: 97 % | BODY MASS INDEX: 25.03 KG/M2 | WEIGHT: 174.8 LBS | HEIGHT: 70 IN | HEART RATE: 86 BPM | RESPIRATION RATE: 16 BRPM

## 2025-06-26 DIAGNOSIS — R06.02 SOB (SHORTNESS OF BREATH): Primary | ICD-10-CM

## 2025-06-26 DIAGNOSIS — R53.81 MALAISE: Primary | ICD-10-CM

## 2025-06-26 PROBLEM — Z98.890 HISTORY OF CARPAL TUNNEL RELEASE: Status: ACTIVE | Noted: 2024-07-11

## 2025-06-26 LAB
BASOPHILS # BLD AUTO: 0.1 10E3/UL (ref 0–0.2)
BASOPHILS NFR BLD AUTO: 1 %
EOSINOPHIL # BLD AUTO: 0.2 10E3/UL (ref 0–0.7)
EOSINOPHIL NFR BLD AUTO: 2 %
ERYTHROCYTE [DISTWIDTH] IN BLOOD BY AUTOMATED COUNT: 13.4 % (ref 10–15)
HCT VFR BLD AUTO: 42.2 % (ref 35–47)
HGB BLD-MCNC: 14 G/DL (ref 11.7–15.7)
IMM GRANULOCYTES # BLD: 0 10E3/UL
IMM GRANULOCYTES NFR BLD: 0 %
LYMPHOCYTES # BLD AUTO: 1.3 10E3/UL (ref 0.8–5.3)
LYMPHOCYTES NFR BLD AUTO: 13 %
MCH RBC QN AUTO: 27.8 PG (ref 26.5–33)
MCHC RBC AUTO-ENTMCNC: 33.2 G/DL (ref 31.5–36.5)
MCV RBC AUTO: 84 FL (ref 78–100)
MONOCYTES # BLD AUTO: 0.7 10E3/UL (ref 0–1.3)
MONOCYTES NFR BLD AUTO: 7 %
NEUTROPHILS # BLD AUTO: 7.5 10E3/UL (ref 1.6–8.3)
NEUTROPHILS NFR BLD AUTO: 77 %
PLATELET # BLD AUTO: 196 10E3/UL (ref 150–450)
RBC # BLD AUTO: 5.03 10E6/UL (ref 3.8–5.2)
SARS-COV-2 RNA RESP QL NAA+PROBE: NEGATIVE
WBC # BLD AUTO: 9.7 10E3/UL (ref 4–11)

## 2025-06-26 NOTE — PATIENT INSTRUCTIONS
Dear Shayla Madrigal,    We are sorry you are not feeling well. Based on the responses you provided, it is recommended that you be seen in-person in urgent care so we can better evaluate your symptoms. Please click here to find the nearest urgent care location to you.   You will not be charged for this Visit. Thank you for trusting us with your care.    Flora Summers PA-C

## 2025-06-26 NOTE — PROGRESS NOTES
Assessment & Plan   Problem List Items Addressed This Visit    None  Visit Diagnoses         Malaise    -  Primary    Relevant Orders    CBC with Platelets & Differential    COVID-19 Virus (Coronavirus) by PCR Nose           Assessment & Plan  Malaise:  - Malaise likely related to viral infection. Bacterial infection considered if white blood cell counts and neutrophils are high. Lyme disease considered but deemed unlikely. COVID-19 considered due to similar past symptoms, but initial test was negative.  - Order CBC to assess white blood cell counts. If counts are high, antibiotics will be prescribed. Order COVID-19 test for confirmation. If positive, patient can access Paxlovid through [facility] protocol. Stay hydrated and use Tylenol and ibuprofen for symptom relief.  - Risks and side effects: Paxlovid reduces risk of hospitalization by 90 to 95%. Patient informed of option to decline Paxlovid.     Consent was obtained from the patient to use an AI documentation tool in the creation of  this note.  Voice recognition software was also used.  There may be associated transcribing errors.     The longitudinal plan of care for the diagnosis(es)/condition(s) as documented were addressed during this visit. Due to the added complexity in care, I will continue to support Shayla in the subsequent management and with ongoing continuity of care.            Margarita Mcguire is a 44 year old, presenting for the following health issues:  Nasal Congestion (Pt c/o congestion, fever, chills, body aches, HA, tired, cough x 4 days. She took ibuprofen at 6am and Dayquil at 8am. Exposed to son fever, congestion, upset stomach over the weekend. She home tested for Covid yesterday which came back negative. )        6/26/2025    10:47 AM   Additional Questions   Roomed by Marti     History of Present Illness       Reason for visit:  Flu type symptoms  Symptom onset:  1-3 days ago  Symptoms include:  Congestion, fever, chills,  "fatigue, headache  Symptom intensity:  Moderate  Symptom progression:  Staying the same  Had these symptoms before:  Yes  Has tried/received treatment for these symptoms:  Yes  Previous treatment was successful:  Yes  Prior treatment description:  Sinus infection treated with antibiotics  What makes it worse:  Exertion  What makes it better:  No   She is taking medications regularly.   - Shayla Madrigal, 44-year-old female.  - Illness started 4 days ago with a sore throat, which was initially annoying but resolved quickly.  - Symptoms progressed to include fatigue, congestion, and cough, with congestion primarily in the head.  - Experienced alternating sensations of feeling hot and cold.  - No runny nose or significant cough.  - Has been sleeping approximately 16 hours a day.  - Took a home COVID test, which was negative.  - No recent exposure to ticks or rolling in grass.                  Objective    /70 (BP Location: Right arm, Patient Position: Sitting)   Pulse 86   Temp 98  F (36.7  C) (Tympanic)   Resp 16   Ht 1.778 m (5' 10\")   Wt 79.3 kg (174 lb 12.8 oz)   LMP  (LMP Unknown)   SpO2 97%   BMI 25.08 kg/m    Body mass index is 25.08 kg/m .  Physical Exam     Physical Exam  - HEENT: Throat examination showed mild postnasal drip, but not severe.  - CARDIOVASCULAR: Heart sounds normal.  - LUNGS: Clear breath sounds.           Signed Electronically by: Amisha Avila MD    "

## 2025-07-07 ENCOUNTER — PATIENT OUTREACH (OUTPATIENT)
Dept: CARE COORDINATION | Facility: CLINIC | Age: 45
End: 2025-07-07
Payer: COMMERCIAL

## 2025-08-07 ENCOUNTER — PATIENT OUTREACH (OUTPATIENT)
Dept: CARE COORDINATION | Facility: CLINIC | Age: 45
End: 2025-08-07
Payer: COMMERCIAL